# Patient Record
Sex: MALE | Race: BLACK OR AFRICAN AMERICAN | Employment: OTHER | URBAN - METROPOLITAN AREA
[De-identification: names, ages, dates, MRNs, and addresses within clinical notes are randomized per-mention and may not be internally consistent; named-entity substitution may affect disease eponyms.]

---

## 2020-08-12 ENCOUNTER — HOSPITAL ENCOUNTER (OUTPATIENT)
Dept: CT IMAGING | Age: 75
Discharge: HOME | End: 2020-08-14
Payer: COMMERCIAL

## 2020-08-12 PROCEDURE — 70486 CT MAXILLOFACIAL W/O DYE: CPT

## 2020-08-12 PROCEDURE — 76380 CAT SCAN FOLLOW-UP STUDY: CPT

## 2020-10-14 ENCOUNTER — HOSPITAL ENCOUNTER (OUTPATIENT)
Age: 75
Discharge: HOME | End: 2020-10-14
Payer: COMMERCIAL

## 2020-10-14 LAB — IGE: 3 IU/ML

## 2020-10-14 PROCEDURE — 86003 ALLG SPEC IGE CRUDE XTRC EA: CPT

## 2020-10-14 PROCEDURE — 36415 COLL VENOUS BLD VENIPUNCTURE: CPT

## 2020-10-14 PROCEDURE — 82785 ASSAY OF IGE: CPT

## 2020-10-15 LAB
2000687N OAK TREE IGE: <0.34 KU/L (ref 0–0.34)
ALLERGEN BERMUDA GRASS IGE: <0.34 KU/L (ref 0–0.34)
ALLERGEN BIRCH IGE: <0.34 KU/L (ref 0–0.34)
ALLERGEN DOG DANDER IGE: <0.34 KU/L (ref 0–0.34)
ALLERGEN GERMAN COCKROACH IGE: <0.34 KU/L (ref 0–0.34)
ALLERGEN HORMODENDRUM IGE: <0.34 KUL/L (ref 0–0.34)
ALLERGEN JOHNSON GRASS IGE: <0.34 KU/L (ref 0–0.34)
ALLERGEN LAMB'S QUARTER IGE: <0.34 KU/L (ref 0–0.34)
ALLERGEN PIGWEED ROUGH IGE: <0.34 KU/L (ref 0–0.34)
ALLERGEN SHEEP SORREL (W18) IGE: <0.34 KU/L (ref 0–0.34)
ALLERGEN WALNUT TREE IGE: <0.34 KU/L (ref 0–0.34)
ALTERNARIA ALTERNATA: <0.34 KU/L (ref 0–0.34)
ASPERGILLUS FUMIGATUS: <0.34 KU/L (ref 0–0.34)
CANDIDA ALBICANS IGE: <0.34 KU/L (ref 0–0.34)
CAT DANDER ANTIBODY: <0.34 KU/L (ref 0–0.34)
COTTONWOOD TREE: <0.34 KU/L (ref 0–0.34)
D. FARINAE: <0.34 KU/L (ref 0–0.34)
D. PTERONYSSINUS: <0.34 KU/L (ref 0–0.34)
ELM TREE: <0.34 KU/L (ref 0–0.34)
MAPLE/BOXELDER TREE: <0.34 KU/L (ref 0–0.34)
SHORT RAGWD(A ARTEMIS.) IGE: <0.34 KU/L (ref 0–0.34)
TIMOTHY GRASS: <0.34 KU/L (ref 0–0.34)

## 2021-07-25 ENCOUNTER — APPOINTMENT (OUTPATIENT)
Dept: CT IMAGING | Age: 76
End: 2021-07-25
Payer: COMMERCIAL

## 2021-07-25 ENCOUNTER — HOSPITAL ENCOUNTER (EMERGENCY)
Age: 76
Discharge: HOME | End: 2021-07-25
Attending: EMERGENCY MEDICINE
Payer: COMMERCIAL

## 2021-07-25 VITALS
HEIGHT: 64 IN | HEART RATE: 76 BPM | RESPIRATION RATE: 14 BRPM | TEMPERATURE: 98 F | BODY MASS INDEX: 29.6 KG/M2 | WEIGHT: 173.4 LBS | SYSTOLIC BLOOD PRESSURE: 126 MMHG | OXYGEN SATURATION: 97 % | DIASTOLIC BLOOD PRESSURE: 74 MMHG

## 2021-07-25 DIAGNOSIS — R10.31 RIGHT LOWER QUADRANT ABDOMINAL PAIN: Primary | ICD-10-CM

## 2021-07-25 LAB
-: ABNORMAL
ABSOLUTE EOS #: 0.06 K/UL (ref 0–0.44)
ABSOLUTE IMMATURE GRANULOCYTE: 0.03 K/UL (ref 0–0.3)
ABSOLUTE LYMPH #: 1.24 K/UL (ref 1.1–3.7)
ABSOLUTE MONO #: 0.73 K/UL (ref 0.1–1.2)
ALBUMIN SERPL-MCNC: 4.2 G/DL (ref 3.5–5.2)
ALBUMIN/GLOBULIN RATIO: NORMAL (ref 1–2.5)
ALP BLD-CCNC: 105 U/L (ref 40–129)
ALT SERPL-CCNC: 19 U/L (ref 5–41)
AMORPHOUS: ABNORMAL
ANION GAP SERPL CALCULATED.3IONS-SCNC: 12 MMOL/L (ref 9–17)
AST SERPL-CCNC: 16 U/L
BACTERIA: ABNORMAL
BASOPHILS # BLD: 0 % (ref 0–2)
BASOPHILS ABSOLUTE: 0.03 K/UL (ref 0–0.2)
BILIRUB SERPL-MCNC: 0.62 MG/DL (ref 0.3–1.2)
BILIRUBIN DIRECT: 0.14 MG/DL
BILIRUBIN URINE: NEGATIVE
BILIRUBIN, INDIRECT: 0.48 MG/DL (ref 0–1)
BUN BLDV-MCNC: 12 MG/DL (ref 8–23)
BUN/CREAT BLD: 15 (ref 9–20)
CALCIUM SERPL-MCNC: 9.3 MG/DL (ref 8.6–10.4)
CASTS UA: ABNORMAL /LPF
CHLORIDE BLD-SCNC: 105 MMOL/L (ref 98–107)
CO2: 24 MMOL/L (ref 20–31)
COLOR: YELLOW
COMMENT UA: ABNORMAL
CREAT SERPL-MCNC: 0.82 MG/DL (ref 0.7–1.2)
CRYSTALS, UA: ABNORMAL /HPF
DIFFERENTIAL TYPE: ABNORMAL
EOSINOPHILS RELATIVE PERCENT: 1 % (ref 1–4)
EPITHELIAL CELLS UA: ABNORMAL /HPF (ref 0–5)
GFR AFRICAN AMERICAN: >60 ML/MIN
GFR NON-AFRICAN AMERICAN: >60 ML/MIN
GFR SERPL CREATININE-BSD FRML MDRD: ABNORMAL ML/MIN/{1.73_M2}
GFR SERPL CREATININE-BSD FRML MDRD: ABNORMAL ML/MIN/{1.73_M2}
GLOBULIN: NORMAL G/DL (ref 1.5–3.8)
GLUCOSE BLD-MCNC: 130 MG/DL (ref 70–99)
GLUCOSE URINE: NEGATIVE
HCT VFR BLD CALC: 42.7 % (ref 40.7–50.3)
HEMOGLOBIN: 13 G/DL (ref 13–17)
IMMATURE GRANULOCYTES: 0 %
KETONES, URINE: NEGATIVE
LACTIC ACID, SEPSIS WHOLE BLOOD: NORMAL MMOL/L (ref 0.5–1.9)
LACTIC ACID, SEPSIS: 1.5 MMOL/L (ref 0.5–1.9)
LEUKOCYTE ESTERASE, URINE: NEGATIVE
LIPASE: 21 U/L (ref 13–60)
LYMPHOCYTES # BLD: 15 % (ref 24–43)
MCH RBC QN AUTO: 23.6 PG (ref 25.2–33.5)
MCHC RBC AUTO-ENTMCNC: 30.4 G/DL (ref 28.4–34.8)
MCV RBC AUTO: 77.5 FL (ref 82.6–102.9)
MONOCYTES # BLD: 9 % (ref 3–12)
MUCUS: ABNORMAL
NITRITE, URINE: NEGATIVE
NRBC AUTOMATED: 0 PER 100 WBC
OTHER OBSERVATIONS UA: ABNORMAL
PDW BLD-RTO: 17.2 % (ref 11.8–14.4)
PH UA: 5.5 (ref 5–8)
PLATELET # BLD: 217 K/UL (ref 138–453)
PLATELET ESTIMATE: ABNORMAL
PMV BLD AUTO: 9.8 FL (ref 8.1–13.5)
POTASSIUM SERPL-SCNC: 4.4 MMOL/L (ref 3.7–5.3)
PROTEIN UA: ABNORMAL
RBC # BLD: 5.51 M/UL (ref 4.21–5.77)
RBC # BLD: ABNORMAL 10*6/UL
RBC UA: ABNORMAL /HPF (ref 0–2)
RENAL EPITHELIAL, UA: ABNORMAL /HPF
SEG NEUTROPHILS: 75 % (ref 36–65)
SEGMENTED NEUTROPHILS ABSOLUTE COUNT: 5.95 K/UL (ref 1.5–8.1)
SODIUM BLD-SCNC: 141 MMOL/L (ref 135–144)
SPECIFIC GRAVITY UA: 1.03 (ref 1–1.03)
TOTAL PROTEIN: 7.2 G/DL (ref 6.4–8.3)
TRICHOMONAS: ABNORMAL
TURBIDITY: CLEAR
URINE HGB: ABNORMAL
UROBILINOGEN, URINE: NORMAL
WBC # BLD: 8 K/UL (ref 3.5–11.3)
WBC # BLD: ABNORMAL 10*3/UL
WBC UA: ABNORMAL /HPF (ref 0–5)
YEAST: ABNORMAL

## 2021-07-25 PROCEDURE — 83690 ASSAY OF LIPASE: CPT

## 2021-07-25 PROCEDURE — 6360000004 HC RX CONTRAST MEDICATION: Performed by: NURSE PRACTITIONER

## 2021-07-25 PROCEDURE — 80048 BASIC METABOLIC PNL TOTAL CA: CPT

## 2021-07-25 PROCEDURE — 85025 COMPLETE CBC W/AUTO DIFF WBC: CPT

## 2021-07-25 PROCEDURE — 99282 EMERGENCY DEPT VISIT SF MDM: CPT

## 2021-07-25 PROCEDURE — 74177 CT ABD & PELVIS W/CONTRAST: CPT

## 2021-07-25 PROCEDURE — 96374 THER/PROPH/DIAG INJ IV PUSH: CPT

## 2021-07-25 PROCEDURE — 80076 HEPATIC FUNCTION PANEL: CPT

## 2021-07-25 PROCEDURE — 83605 ASSAY OF LACTIC ACID: CPT

## 2021-07-25 PROCEDURE — 2580000003 HC RX 258: Performed by: NURSE PRACTITIONER

## 2021-07-25 PROCEDURE — 6360000002 HC RX W HCPCS: Performed by: NURSE PRACTITIONER

## 2021-07-25 PROCEDURE — 81001 URINALYSIS AUTO W/SCOPE: CPT

## 2021-07-25 RX ORDER — SODIUM CHLORIDE 0.9 % (FLUSH) 0.9 %
10 SYRINGE (ML) INJECTION ONCE
Status: COMPLETED | OUTPATIENT
Start: 2021-07-25 | End: 2021-07-25

## 2021-07-25 RX ORDER — 0.9 % SODIUM CHLORIDE 0.9 %
80 INTRAVENOUS SOLUTION INTRAVENOUS ONCE
Status: COMPLETED | OUTPATIENT
Start: 2021-07-25 | End: 2021-07-25

## 2021-07-25 RX ORDER — SODIUM CHLORIDE 9 MG/ML
INJECTION, SOLUTION INTRAVENOUS CONTINUOUS
Status: DISCONTINUED | OUTPATIENT
Start: 2021-07-25 | End: 2021-07-25 | Stop reason: HOSPADM

## 2021-07-25 RX ORDER — ACETAMINOPHEN AND CODEINE PHOSPHATE 300; 30 MG/1; MG/1
1 TABLET ORAL EVERY 8 HOURS PRN
Qty: 12 TABLET | Refills: 0 | Status: SHIPPED | OUTPATIENT
Start: 2021-07-25 | End: 2021-07-29

## 2021-07-25 RX ORDER — ONDANSETRON 2 MG/ML
4 INJECTION INTRAMUSCULAR; INTRAVENOUS ONCE
Status: COMPLETED | OUTPATIENT
Start: 2021-07-25 | End: 2021-07-25

## 2021-07-25 RX ADMIN — IOPAMIDOL 75 ML: 755 INJECTION, SOLUTION INTRAVENOUS at 12:41

## 2021-07-25 RX ADMIN — SODIUM CHLORIDE, PRESERVATIVE FREE 10 ML: 5 INJECTION INTRAVENOUS at 12:49

## 2021-07-25 RX ADMIN — ONDANSETRON 4 MG: 2 INJECTION INTRAMUSCULAR; INTRAVENOUS at 11:42

## 2021-07-25 RX ADMIN — SODIUM CHLORIDE 80 ML: 9 INJECTION, SOLUTION INTRAVENOUS at 12:50

## 2021-07-25 RX ADMIN — SODIUM CHLORIDE: 9 INJECTION, SOLUTION INTRAVENOUS at 11:42

## 2021-07-25 ASSESSMENT — ENCOUNTER SYMPTOMS
NAUSEA: 1
COLOR CHANGE: 0
DIARRHEA: 0
BACK PAIN: 0
CONSTIPATION: 0
COUGH: 0
ABDOMINAL PAIN: 1
SHORTNESS OF BREATH: 0
VOMITING: 0

## 2021-07-25 ASSESSMENT — PAIN SCALES - GENERAL: PAINLEVEL_OUTOF10: 7

## 2021-07-25 NOTE — ED PROVIDER NOTES
TABLET    Take 40 mg by mouth nightly    TAMSULOSIN (FLOMAX) 0.4 MG CAPSULE    Take 0.4 mg by mouth 2 times daily        PAST MEDICAL HISTORY         Diagnosis Date    Anesthesia complication     pt states he had trouble urinating and moving his bowels (bloody stools) after his last surgery 10/2015)    Arthritis     back    BPH (benign prostatic hyperplasia)     CAD (coronary artery disease)     x 1 stent    Chronic shoulder pain     lt (on 10//28/16 pt states \"I'm due to have surgery next month\"    GERD (gastroesophageal reflux disease)     Hyperlipidemia     Kidney stone     passed    Lung collapse yrs ago    chest tube insertion (patient coughed and sneezed at same time)       SURGICAL HISTORY           Procedure Laterality Date    CERVICAL FUSION      CORONARY ANGIOPLASTY WITH STENT PLACEMENT  3/22/2016    x 1    CYSTOSCOPY  12/04/2015    FINGER REPLANTATION Left     FINGER TRIGGER RELEASE Right     LUMBAR DISCECTOMY  2/10/2016    posterior L5-S1    ROTATOR CUFF REPAIR Bilateral     SHOULDER ARTHROSCOPY Left 01/11/2017    subacromial decompression         FAMILY HISTORY     History reviewed. No pertinent family history. No family status information on file. SOCIAL HISTORY      reports that he quit smoking about 20 years ago. He has a 20.00 pack-year smoking history. He has never used smokeless tobacco. He reports that he does not drink alcohol and does not use drugs. REVIEW OF SYSTEMS    (2-9 systems for level 4, 10 or more for level 5)     Review of Systems   Constitutional: Negative for chills, diaphoresis, fatigue and fever. Respiratory: Negative for cough and shortness of breath. Cardiovascular: Negative for chest pain. Gastrointestinal: Positive for abdominal pain and nausea. Negative for constipation, diarrhea and vomiting. Genitourinary: Negative for dysuria, flank pain, frequency, hematuria and urgency. Musculoskeletal: Negative for back pain.    Skin: Negative for color change, rash and wound. Neurological: Negative for dizziness, weakness and headaches. Except as noted above the remainder of the review of systems was reviewed and negative. PHYSICAL EXAM    (up to 7 for level 4, 8 or more for level 5)     ED Triage Vitals [07/25/21 1121]   BP Temp Temp Source Pulse Resp SpO2 Height Weight   126/74 98 °F (36.7 °C) Oral 76 14 97 % 5' 4\" (1.626 m) 173 lb 6.4 oz (78.7 kg)     Physical Exam  Vitals reviewed. Constitutional:       General: He is not in acute distress. Appearance: He is well-developed. He is not diaphoretic. Eyes:      General: No scleral icterus. Conjunctiva/sclera: Conjunctivae normal.   Neck:      Vascular: No JVD. Cardiovascular:      Rate and Rhythm: Normal rate. Pulmonary:      Effort: Pulmonary effort is normal. No respiratory distress. Breath sounds: No stridor. Abdominal:      General: There is no distension. Palpations: Abdomen is soft. Tenderness: There is abdominal tenderness in the right lower quadrant. There is no guarding. Musculoskeletal:      Comments: Moves extremities   Skin:     General: Skin is warm and dry. Findings: No rash. Neurological:      Mental Status: He is alert and oriented to person, place, and time.    Psychiatric:         Behavior: Behavior normal.         DIAGNOSTIC RESULTS     RADIOLOGY:   Non-plain film images such as CT, Ultrasound and MRI are read by the radiologist. Plain radiographic images are visualized and preliminarily interpreted by the emergency physician with the below findings:    Interpretation per the Radiologist below, if available at the time of this note:    CT ABDOMEN PELVIS W IV CONTRAST Additional Contrast? None    Result Date: 7/25/2021  EXAMINATION: CT OF THE ABDOMEN AND PELVIS WITH CONTRAST 7/25/2021 12:39 pm TECHNIQUE: CT of the abdomen and pelvis was performed with the administration of intravenous contrast. Multiplanar reformatted images are provided for review. Dose modulation, iterative reconstruction, and/or weight based adjustment of the mA/kV was utilized to reduce the radiation dose to as low as reasonably achievable. COMPARISON: 10/24/2015 HISTORY: ORDERING SYSTEM PROVIDED HISTORY: RLQ pain TECHNOLOGIST PROVIDED HISTORY: RLQ pain Decision Support Exception - unselect if not a suspected or confirmed emergency medical condition->Emergency Medical Condition (MA) Reason for Exam: Pt states RLQ pain, nausea. Since 7/21/21, nausea this a.m. Acuity: Acute Type of Exam: Initial FINDINGS: Lower Chest: No acute findings. Organs: The liver, gallbladder, pancreas, spleen, adrenals and kidneys reveal no acute findings. Stable hypoattenuating liver lesion in the central aspect of the liver near the middle hepatic vein on axial image 23 measuring 1.2 cm. This may represent a hemangioma. Subcentimeter renal cysts. GI/Bowel: Diverticulosis. Proximal colonic diverticula are noted with mild induration of the adjacent fat on axial image 81. The appendix is separate from this site and appears normal.  No evidence for bowel obstruction. No gross perforation or evidence for abscess formation. Pelvis: The bladder is decompressed with mild apparent diffuse bladder wall thickening, which may represent underlying hypertrophy that is similar compared to the prior exam.  Mild prostatomegaly. Peritoneum/Retroperitoneum: No free air or free fluid. The aorta is normal in caliber. The visceral branches are patent. Calcified atheromatous plaque is present. No lymphadenopathy. Bones/Soft Tissues: Multilevel disc space narrowing and facet arthropathy. No acute abnormality identified. 1.  Findings compatible with mild acute right-sided diverticulosis in the ascending colon. 2.  Normal appendix.        LABS:  Labs Reviewed   URINALYSIS WITH MICROSCOPIC - Abnormal; Notable for the following components:       Result Value    Specific Gravity, UA 1.035 (*)     Urine Hgb 2+ (*) Protein, UA TRACE (*)     Mucus, UA 1+ (*)     All other components within normal limits   BASIC METABOLIC PANEL - Abnormal; Notable for the following components:    Glucose 130 (*)     All other components within normal limits   CBC WITH AUTO DIFFERENTIAL - Abnormal; Notable for the following components:    MCV 77.5 (*)     MCH 23.6 (*)     RDW 17.2 (*)     Seg Neutrophils 75 (*)     Lymphocytes 15 (*)     All other components within normal limits   LACTATE, SEPSIS   LIPASE   HEPATIC FUNCTION PANEL   LACTATE, SEPSIS       All other labs were within normal range or not returned as of this dictation. EMERGENCY DEPARTMENT COURSE and DIFFERENTIAL DIAGNOSIS/MDM:   Vitals:    Vitals:    07/25/21 1121   BP: 126/74   Pulse: 76   Resp: 14   Temp: 98 °F (36.7 °C)   TempSrc: Oral   SpO2: 97%   Weight: 173 lb 6.4 oz (78.7 kg)   Height: 5' 4\" (1.626 m)       MEDICATIONS GIVEN IN THE ED:  Medications   0.9 % sodium chloride infusion ( Intravenous Stopped 7/25/21 1249)   ondansetron (ZOFRAN) injection 4 mg (4 mg Intravenous Given 7/25/21 1142)   0.9 % sodium chloride bolus (0 mLs Intravenous Stopped 7/25/21 1251)   iopamidol (ISOVUE-370) 76 % injection 75 mL (75 mLs Intravenous Given 7/25/21 1241)   sodium chloride flush 0.9 % injection 10 mL (10 mLs Intravenous Given 7/25/21 1249)       CLINICAL DECISION MAKING:  The patient presented alert with a nontoxic appearance and was seen in conjunction with Dr. Mulu Way. Laboratory studies were unremarkable. Imaging showed diverticulosis. OARRS was reviewed. A prescription was written for tylenol #3. The patient was advised to not drink alcohol, drive, or operate heavy machinery while taking the medication. Follow up with pcp, return to ED if condition worsens. FINAL IMPRESSION      1.  Right lower quadrant abdominal pain            Problem List  Patient Active Problem List   Diagnosis Code    Rectal bleeding K62.5    Acute lower GI bleeding K92.2    Diarrhea R19.7    History of colon polyps Z86.010    Chest pain R07.9    GERD (gastroesophageal reflux disease) K21.9    Hypercholesterolemia E78.00         DISPOSITION/PLAN   DISPOSITION Decision To Discharge 07/25/2021 01:20:23 PM      PATIENT REFERRED TO:   Mala Blue MD  [address] [facility] [address]  [phone]    Schedule an appointment as soon as possible for a visit       [facility] ED  [address] [facility]  [phone]    If symptoms worsen, As needed      DISCHARGE MEDICATIONS:     New Prescriptions    No medications on file           (Please note that portions of this note were completed with a voice recognition program.  Efforts were made to edit the dictations but occasionally words are mis-transcribed.)    KEN Arora - KEN Perez CNP  07/25/21 [address] [place] [address], APRN - CNP  07/25/21 1600

## 2021-07-25 NOTE — ED PROVIDER NOTES
The patient was seen and examined by me in conjunction with the mid-level provider. I agree with his/her assessment and treatment plan. CAT scan shows no cause for his pain. The rest of his work-up is negative as well.      Adriano Hurley MD  07/25/21 6079

## 2023-01-27 ENCOUNTER — ANESTHESIA (OUTPATIENT)
Dept: OPERATING ROOM | Age: 78
End: 2023-01-27
Payer: COMMERCIAL

## 2023-01-27 ENCOUNTER — HOSPITAL ENCOUNTER (OUTPATIENT)
Dept: ULTRASOUND IMAGING | Age: 78
End: 2023-01-27
Attending: UROLOGY
Payer: COMMERCIAL

## 2023-01-27 ENCOUNTER — ANESTHESIA EVENT (OUTPATIENT)
Dept: OPERATING ROOM | Age: 78
End: 2023-01-27
Payer: COMMERCIAL

## 2023-01-27 ENCOUNTER — HOSPITAL ENCOUNTER (EMERGENCY)
Age: 78
Discharge: HOME | End: 2023-01-28
Attending: STUDENT IN AN ORGANIZED HEALTH CARE EDUCATION/TRAINING PROGRAM
Payer: COMMERCIAL

## 2023-01-27 ENCOUNTER — HOSPITAL ENCOUNTER (OUTPATIENT)
Age: 78
Setting detail: OUTPATIENT SURGERY
Discharge: HOME | End: 2023-01-27
Attending: UROLOGY | Admitting: UROLOGY
Payer: COMMERCIAL

## 2023-01-27 VITALS
HEART RATE: 79 BPM | OXYGEN SATURATION: 99 % | SYSTOLIC BLOOD PRESSURE: 96 MMHG | DIASTOLIC BLOOD PRESSURE: 59 MMHG | WEIGHT: 170 LBS | RESPIRATION RATE: 18 BRPM | TEMPERATURE: 97.2 F | BODY MASS INDEX: 29.02 KG/M2 | HEIGHT: 64 IN

## 2023-01-27 VITALS
OXYGEN SATURATION: 97 % | HEIGHT: 64 IN | TEMPERATURE: 98.6 F | BODY MASS INDEX: 29.02 KG/M2 | WEIGHT: 170 LBS | HEART RATE: 103 BPM | RESPIRATION RATE: 20 BRPM | SYSTOLIC BLOOD PRESSURE: 128 MMHG | DIASTOLIC BLOOD PRESSURE: 41 MMHG

## 2023-01-27 DIAGNOSIS — N30.01 ACUTE CYSTITIS WITH HEMATURIA: ICD-10-CM

## 2023-01-27 DIAGNOSIS — K62.5 RECTAL BLEEDING: Primary | ICD-10-CM

## 2023-01-27 DIAGNOSIS — R31.0 GROSS HEMATURIA: ICD-10-CM

## 2023-01-27 DIAGNOSIS — R97.20 ELEVATED PSA: ICD-10-CM

## 2023-01-27 DIAGNOSIS — R33.9 URINARY RETENTION: Primary | ICD-10-CM

## 2023-01-27 LAB
ABSOLUTE EOS #: 0.05 K/UL (ref 0–0.44)
ABSOLUTE IMMATURE GRANULOCYTE: 0.08 K/UL (ref 0–0.3)
ABSOLUTE LYMPH #: 1.21 K/UL (ref 1.1–3.7)
ABSOLUTE MONO #: 0.96 K/UL (ref 0.1–1.2)
ANION GAP SERPL CALCULATED.3IONS-SCNC: 11 MMOL/L (ref 9–17)
BASOPHILS # BLD: 0 % (ref 0–2)
BASOPHILS ABSOLUTE: 0.04 K/UL (ref 0–0.2)
BILIRUBIN URINE: NEGATIVE
BILIRUBIN URINE: NEGATIVE
BUN BLDV-MCNC: 15 MG/DL (ref 8–23)
BUN/CREAT BLD: 11 (ref 9–20)
CALCIUM SERPL-MCNC: 9.4 MG/DL (ref 8.6–10.4)
CHLORIDE BLD-SCNC: 102 MMOL/L (ref 98–107)
CO2: 24 MMOL/L (ref 20–31)
COLOR: ABNORMAL
COLOR: YELLOW
CREAT SERPL-MCNC: 1.33 MG/DL (ref 0.7–1.2)
EOSINOPHILS RELATIVE PERCENT: 0 % (ref 1–4)
EPITHELIAL CELLS UA: ABNORMAL /HPF (ref 0–5)
EPITHELIAL CELLS UA: NORMAL /HPF (ref 0–5)
GFR SERPL CREATININE-BSD FRML MDRD: 56 ML/MIN/1.73M2
GLUCOSE BLD-MCNC: 129 MG/DL (ref 70–99)
GLUCOSE URINE: NEGATIVE
GLUCOSE URINE: NEGATIVE
HCT VFR BLD CALC: 42.5 % (ref 40.7–50.3)
HEMOGLOBIN: 13 G/DL (ref 13–17)
IMMATURE GRANULOCYTES: 1 %
INR BLD: 1.1
KETONES, URINE: NEGATIVE
KETONES, URINE: NEGATIVE
LEUKOCYTE ESTERASE, URINE: ABNORMAL
LEUKOCYTE ESTERASE, URINE: NEGATIVE
LYMPHOCYTES # BLD: 9 % (ref 24–43)
MCH RBC QN AUTO: 23.3 PG (ref 25.2–33.5)
MCHC RBC AUTO-ENTMCNC: 30.6 G/DL (ref 28.4–34.8)
MCV RBC AUTO: 76.2 FL (ref 82.6–102.9)
MONOCYTES # BLD: 7 % (ref 3–12)
NITRITE, URINE: NEGATIVE
NITRITE, URINE: POSITIVE
NRBC AUTOMATED: 0 PER 100 WBC
PARTIAL THROMBOPLASTIN TIME: 31.3 SEC (ref 23.9–33.8)
PDW BLD-RTO: 17.5 % (ref 11.8–14.4)
PH UA: 6 (ref 5–8)
PH UA: 6 (ref 5–8)
PLATELET # BLD: 221 K/UL (ref 138–453)
PMV BLD AUTO: 9.8 FL (ref 8.1–13.5)
POTASSIUM SERPL-SCNC: 4.5 MMOL/L (ref 3.7–5.3)
PROTEIN UA: ABNORMAL
PROTEIN UA: NEGATIVE
PROTHROMBIN TIME: 13.8 SEC (ref 11.5–14.2)
RBC # BLD: 5.58 M/UL (ref 4.21–5.77)
RBC # BLD: ABNORMAL 10*6/UL
RBC UA: ABNORMAL /HPF (ref 0–2)
RBC UA: NORMAL /HPF (ref 0–2)
SEG NEUTROPHILS: 83 % (ref 36–65)
SEGMENTED NEUTROPHILS ABSOLUTE COUNT: 11.27 K/UL (ref 1.5–8.1)
SODIUM BLD-SCNC: 137 MMOL/L (ref 135–144)
SPECIFIC GRAVITY UA: 1.01 (ref 1–1.03)
SPECIFIC GRAVITY UA: 1.02 (ref 1–1.03)
TURBIDITY: ABNORMAL
TURBIDITY: ABNORMAL
URINE HGB: ABNORMAL
URINE HGB: ABNORMAL
UROBILINOGEN, URINE: NORMAL
UROBILINOGEN, URINE: NORMAL
WBC # BLD: 13.6 K/UL (ref 3.5–11.3)
WBC UA: ABNORMAL /HPF (ref 0–5)
WBC UA: NORMAL /HPF (ref 0–5)

## 2023-01-27 PROCEDURE — 3700000001 HC ADD 15 MINUTES (ANESTHESIA): Performed by: UROLOGY

## 2023-01-27 PROCEDURE — 87086 URINE CULTURE/COLONY COUNT: CPT

## 2023-01-27 PROCEDURE — 2580000003 HC RX 258: Performed by: ANESTHESIOLOGY

## 2023-01-27 PROCEDURE — 2500000003 HC RX 250 WO HCPCS: Performed by: NURSE ANESTHETIST, CERTIFIED REGISTERED

## 2023-01-27 PROCEDURE — 3700000000 HC ANESTHESIA ATTENDED CARE: Performed by: UROLOGY

## 2023-01-27 PROCEDURE — 85610 PROTHROMBIN TIME: CPT

## 2023-01-27 PROCEDURE — 76942 ECHO GUIDE FOR BIOPSY: CPT

## 2023-01-27 PROCEDURE — 7100000010 HC PHASE II RECOVERY - FIRST 15 MIN: Performed by: UROLOGY

## 2023-01-27 PROCEDURE — 6360000002 HC RX W HCPCS: Performed by: NURSE ANESTHETIST, CERTIFIED REGISTERED

## 2023-01-27 PROCEDURE — 81001 URINALYSIS AUTO W/SCOPE: CPT

## 2023-01-27 PROCEDURE — 80048 BASIC METABOLIC PNL TOTAL CA: CPT

## 2023-01-27 PROCEDURE — 88305 TISSUE EXAM BY PATHOLOGIST: CPT

## 2023-01-27 PROCEDURE — 6370000000 HC RX 637 (ALT 250 FOR IP): Performed by: UROLOGY

## 2023-01-27 PROCEDURE — 3600000012 HC SURGERY LEVEL 2 ADDTL 15MIN: Performed by: UROLOGY

## 2023-01-27 PROCEDURE — 6360000002 HC RX W HCPCS: Performed by: UROLOGY

## 2023-01-27 PROCEDURE — 85730 THROMBOPLASTIN TIME PARTIAL: CPT

## 2023-01-27 PROCEDURE — 7100000011 HC PHASE II RECOVERY - ADDTL 15 MIN: Performed by: UROLOGY

## 2023-01-27 PROCEDURE — 2580000003 HC RX 258: Performed by: UROLOGY

## 2023-01-27 PROCEDURE — 2709999900 HC NON-CHARGEABLE SUPPLY: Performed by: UROLOGY

## 2023-01-27 PROCEDURE — 88344 IMHCHEM/IMCYTCHM EA MLT ANTB: CPT

## 2023-01-27 PROCEDURE — 6370000000 HC RX 637 (ALT 250 FOR IP): Performed by: STUDENT IN AN ORGANIZED HEALTH CARE EDUCATION/TRAINING PROGRAM

## 2023-01-27 PROCEDURE — 85025 COMPLETE CBC W/AUTO DIFF WBC: CPT

## 2023-01-27 PROCEDURE — 3600000002 HC SURGERY LEVEL 2 BASE: Performed by: UROLOGY

## 2023-01-27 PROCEDURE — 55700 US BIOPSY PROSTATE NEEDLE/PUNCH: CPT | Performed by: UROLOGY

## 2023-01-27 RX ORDER — LIDOCAINE HYDROCHLORIDE 20 MG/ML
INJECTION, SOLUTION EPIDURAL; INFILTRATION; INTRACAUDAL; PERINEURAL PRN
Status: DISCONTINUED | OUTPATIENT
Start: 2023-01-27 | End: 2023-01-27 | Stop reason: SDUPTHER

## 2023-01-27 RX ORDER — CIPROFLOXACIN 500 MG/1
1 TABLET, FILM COATED ORAL 2 TIMES DAILY
COMMUNITY
Start: 2022-12-08 | End: 2023-01-28

## 2023-01-27 RX ORDER — SODIUM CHLORIDE 0.9 % (FLUSH) 0.9 %
5-40 SYRINGE (ML) INJECTION PRN
Status: DISCONTINUED | OUTPATIENT
Start: 2023-01-27 | End: 2023-01-27 | Stop reason: HOSPADM

## 2023-01-27 RX ORDER — HYDROMORPHONE HYDROCHLORIDE 1 MG/ML
0.5 INJECTION, SOLUTION INTRAMUSCULAR; INTRAVENOUS; SUBCUTANEOUS EVERY 5 MIN PRN
Status: DISCONTINUED | OUTPATIENT
Start: 2023-01-27 | End: 2023-01-27 | Stop reason: HOSPADM

## 2023-01-27 RX ORDER — ASPIRIN 81 MG/1
81 TABLET, CHEWABLE ORAL DAILY
COMMUNITY

## 2023-01-27 RX ORDER — HYDROCODONE BITARTRATE AND ACETAMINOPHEN 5; 325 MG/1; MG/1
1 TABLET ORAL ONCE
Status: COMPLETED | OUTPATIENT
Start: 2023-01-27 | End: 2023-01-27

## 2023-01-27 RX ORDER — LABETALOL HYDROCHLORIDE 5 MG/ML
10 INJECTION, SOLUTION INTRAVENOUS
Status: DISCONTINUED | OUTPATIENT
Start: 2023-01-27 | End: 2023-01-27 | Stop reason: HOSPADM

## 2023-01-27 RX ORDER — OXYCODONE HYDROCHLORIDE 5 MG/1
10 TABLET ORAL PRN
Status: DISCONTINUED | OUTPATIENT
Start: 2023-01-27 | End: 2023-01-27 | Stop reason: HOSPADM

## 2023-01-27 RX ORDER — PROPOFOL 10 MG/ML
INJECTION, EMULSION INTRAVENOUS CONTINUOUS PRN
Status: DISCONTINUED | OUTPATIENT
Start: 2023-01-27 | End: 2023-01-27 | Stop reason: SDUPTHER

## 2023-01-27 RX ORDER — SODIUM CHLORIDE, SODIUM LACTATE, POTASSIUM CHLORIDE, CALCIUM CHLORIDE 600; 310; 30; 20 MG/100ML; MG/100ML; MG/100ML; MG/100ML
INJECTION, SOLUTION INTRAVENOUS CONTINUOUS
Status: DISCONTINUED | OUTPATIENT
Start: 2023-01-27 | End: 2023-01-27 | Stop reason: HOSPADM

## 2023-01-27 RX ORDER — LIDOCAINE HYDROCHLORIDE 10 MG/ML
1 INJECTION, SOLUTION EPIDURAL; INFILTRATION; INTRACAUDAL; PERINEURAL
Status: DISCONTINUED | OUTPATIENT
Start: 2023-01-28 | End: 2023-01-27 | Stop reason: HOSPADM

## 2023-01-27 RX ORDER — OXYCODONE HYDROCHLORIDE 5 MG/1
5 TABLET ORAL PRN
Status: DISCONTINUED | OUTPATIENT
Start: 2023-01-27 | End: 2023-01-27 | Stop reason: HOSPADM

## 2023-01-27 RX ORDER — LIDOCAINE HYDROCHLORIDE 20 MG/ML
5 JELLY TOPICAL ONCE
Status: COMPLETED | OUTPATIENT
Start: 2023-01-27 | End: 2023-01-27

## 2023-01-27 RX ORDER — SODIUM CHLORIDE 9 MG/ML
INJECTION, SOLUTION INTRAVENOUS CONTINUOUS
Status: DISCONTINUED | OUTPATIENT
Start: 2023-01-27 | End: 2023-01-27

## 2023-01-27 RX ORDER — SODIUM CHLORIDE 9 MG/ML
INJECTION, SOLUTION INTRAVENOUS PRN
Status: DISCONTINUED | OUTPATIENT
Start: 2023-01-27 | End: 2023-01-27 | Stop reason: HOSPADM

## 2023-01-27 RX ORDER — ONDANSETRON 2 MG/ML
INJECTION INTRAMUSCULAR; INTRAVENOUS PRN
Status: DISCONTINUED | OUTPATIENT
Start: 2023-01-27 | End: 2023-01-27 | Stop reason: SDUPTHER

## 2023-01-27 RX ORDER — EPHEDRINE SULFATE/0.9% NACL/PF 50 MG/5 ML
SYRINGE (ML) INTRAVENOUS PRN
Status: DISCONTINUED | OUTPATIENT
Start: 2023-01-27 | End: 2023-01-27 | Stop reason: SDUPTHER

## 2023-01-27 RX ORDER — LIDOCAINE HYDROCHLORIDE 20 MG/ML
JELLY TOPICAL PRN
Status: DISCONTINUED | OUTPATIENT
Start: 2023-01-27 | End: 2023-01-27 | Stop reason: ALTCHOICE

## 2023-01-27 RX ORDER — FENTANYL CITRATE 50 UG/ML
25 INJECTION, SOLUTION INTRAMUSCULAR; INTRAVENOUS EVERY 5 MIN PRN
Status: DISCONTINUED | OUTPATIENT
Start: 2023-01-27 | End: 2023-01-27 | Stop reason: HOSPADM

## 2023-01-27 RX ORDER — PROCHLORPERAZINE EDISYLATE 5 MG/ML
5 INJECTION INTRAMUSCULAR; INTRAVENOUS
Status: DISCONTINUED | OUTPATIENT
Start: 2023-01-27 | End: 2023-01-27 | Stop reason: HOSPADM

## 2023-01-27 RX ORDER — PHENYLEPHRINE HCL IN 0.9% NACL 1 MG/10 ML
SYRINGE (ML) INTRAVENOUS PRN
Status: DISCONTINUED | OUTPATIENT
Start: 2023-01-27 | End: 2023-01-27 | Stop reason: SDUPTHER

## 2023-01-27 RX ORDER — FENTANYL CITRATE 50 UG/ML
INJECTION, SOLUTION INTRAMUSCULAR; INTRAVENOUS PRN
Status: DISCONTINUED | OUTPATIENT
Start: 2023-01-27 | End: 2023-01-27 | Stop reason: SDUPTHER

## 2023-01-27 RX ORDER — SODIUM CHLORIDE 0.9 % (FLUSH) 0.9 %
5-40 SYRINGE (ML) INJECTION EVERY 12 HOURS SCHEDULED
Status: DISCONTINUED | OUTPATIENT
Start: 2023-01-27 | End: 2023-01-27 | Stop reason: HOSPADM

## 2023-01-27 RX ORDER — SULFAMETHOXAZOLE AND TRIMETHOPRIM 400; 80 MG/1; MG/1
1 TABLET ORAL 2 TIMES DAILY
Qty: 10 TABLET | Refills: 0 | Status: SHIPPED | OUTPATIENT
Start: 2023-01-27 | End: 2023-02-01

## 2023-01-27 RX ORDER — ONDANSETRON 2 MG/ML
4 INJECTION INTRAMUSCULAR; INTRAVENOUS
Status: DISCONTINUED | OUTPATIENT
Start: 2023-01-27 | End: 2023-01-27 | Stop reason: HOSPADM

## 2023-01-27 RX ADMIN — FENTANYL CITRATE 25 MCG: 50 INJECTION INTRAMUSCULAR; INTRAVENOUS at 13:53

## 2023-01-27 RX ADMIN — Medication 200 MCG: at 14:10

## 2023-01-27 RX ADMIN — SODIUM CHLORIDE, POTASSIUM CHLORIDE, SODIUM LACTATE AND CALCIUM CHLORIDE: 600; 310; 30; 20 INJECTION, SOLUTION INTRAVENOUS at 11:35

## 2023-01-27 RX ADMIN — Medication 200 MCG: at 14:15

## 2023-01-27 RX ADMIN — HYDROCODONE BITARTRATE AND ACETAMINOPHEN 1 TABLET: 5; 325 TABLET ORAL at 15:04

## 2023-01-27 RX ADMIN — LIDOCAINE HYDROCHLORIDE 50 MG: 20 INJECTION, SOLUTION EPIDURAL; INFILTRATION; INTRACAUDAL; PERINEURAL at 13:56

## 2023-01-27 RX ADMIN — ONDANSETRON 4 MG: 2 INJECTION INTRAMUSCULAR; INTRAVENOUS at 14:00

## 2023-01-27 RX ADMIN — Medication 200 MCG: at 14:18

## 2023-01-27 RX ADMIN — Medication 20 MG: at 14:20

## 2023-01-27 RX ADMIN — Medication 100 MCG: at 14:03

## 2023-01-27 RX ADMIN — TOBRAMYCIN 120 MG: 40 INJECTION INTRAMUSCULAR; INTRAVENOUS at 12:41

## 2023-01-27 RX ADMIN — PROPOFOL 75 MCG/KG/MIN: 10 INJECTION, EMULSION INTRAVENOUS at 13:56

## 2023-01-27 RX ADMIN — Medication 200 MCG: at 14:20

## 2023-01-27 RX ADMIN — PROPOFOL 20 MG: 10 INJECTION, EMULSION INTRAVENOUS at 13:54

## 2023-01-27 RX ADMIN — FENTANYL CITRATE 25 MCG: 50 INJECTION INTRAMUSCULAR; INTRAVENOUS at 14:08

## 2023-01-27 RX ADMIN — LIDOCAINE HYDROCHLORIDE 5 ML: 20 JELLY TOPICAL at 21:05

## 2023-01-27 ASSESSMENT — PAIN - FUNCTIONAL ASSESSMENT: PAIN_FUNCTIONAL_ASSESSMENT: 0-10

## 2023-01-27 ASSESSMENT — PAIN SCALES - GENERAL: PAINLEVEL_OUTOF10: 10

## 2023-01-27 NOTE — H&P
History and Physical Service   [facility] [place] - INPATIENT    HISTORY AND PHYSICAL EXAMINATION            Date of Evaluation: 1/27/2023  Patient name:  Reta Martinez  MRN:   [id number]  YOB: 1945  PCP: Margaret Bahena MD    History Obtained From:     Patient, medical records    History of Present Illness: This is Reta Martinez a 78 y.o. male who presents today for a CYSTOSCOPY PROSTATE BIOPSY   WITH U/S TECH by Ben Clement MD for Elevated PSA [R97.20]. Patient last saw Dr. Jada Mcleod in office on 12/08/2022. Due to his elevated PSA it was recommended to have a prostate biopsy. He has been experiencing urinary urgency and frequency as well as difficulty starting his stream and emptying his bladder fully. He denies any dysuria or hematuria. Denies fever, chills, shortness of breath, cough, congestion, wheezing, chest pain, open sores or wounds. Denies hx of diabetes. Last aspirin 01/19/2023. Hx of CAD with one cardiac stent in 2016. Followed with cardiology Dr. Seth Pereira but he has left the area and patient states he \"hasn't seen anyone in about 2 years\". Denies any current chest pain, shortness of breath, palpitations, or lightheadedness. Patient states after anesthesia in the past he had urinary retention and constipation and had to be discharge home with a carroll.      Past Medical History:     Past Medical History:   Diagnosis Date    Anesthesia complication     pt states he had trouble urinating and moving his bowels (bloody stools) after his last surgery 10/2015)    Arthritis     back    BPH (benign prostatic hyperplasia)     CAD (coronary artery disease)     x 1 stent    Chronic shoulder pain     lt (on 10//28/16 pt states \"I'm due to have surgery next month\"    GERD (gastroesophageal reflux disease)     Hyperlipidemia     Kidney stone     passed    Lung collapse yrs ago    chest tube insertion (patient coughed and sneezed at same time)        Past Surgical History:     Past Surgical History:   Procedure Laterality Date    CERVICAL FUSION      CORONARY ANGIOPLASTY WITH STENT PLACEMENT  3/22/2016    x 1    CYSTOSCOPY  12/04/2015    FINGER REPLANTATION Left     LUMBAR DISCECTOMY  2/10/2016    posterior L5-S1    ROTATOR CUFF REPAIR Bilateral     SHOULDER ARTHROSCOPY Left 01/11/2017    subacromial decompression    TRIGGER FINGER RELEASE Right         Medications Prior to Admission:     Prior to Admission medications    Medication Sig Start Date End Date Taking?  Authorizing Provider   carvedilol (COREG) 12.5 MG tablet Take 12.5 mg by mouth 2 times daily (with meals) Unsure of dose    Historical Provider, MD   prasugrel (EFFIENT) 10 MG TABS Take 10 mg by mouth 2 times daily On 10/28/16 pt states \"I think the Dr took me off of that\"    Historical Provider, MD   isosorbide mononitrate (IMDUR) 30 MG CR tablet Take 30 mg by mouth daily    Historical Provider, MD   lisinopril (PRINIVIL;ZESTRIL) 2.5 MG tablet Take 2.5 mg by mouth daily    Historical Provider, MD   nitroGLYCERIN (NITROSTAT) 0.4 MG SL tablet Place 0.4 mg under the tongue every 5 minutes as needed for Chest pain    Historical Provider, MD   oxyCODONE-acetaminophen (PERCOCET) 5-325 MG per tablet Take 1 tablet by mouth every 4 hours as needed for Pain out    Historical Provider, MD   ibuprofen (ADVIL;MOTRIN) 800 MG tablet Take 800 mg by mouth every 6 hours as needed for Pain    Historical Provider, MD   ferrous sulfate 325 (65 FE) MG EC tablet Take 1 tablet by mouth daily (with breakfast) 2/11/16   Kalie Zhang MD   docusate sodium (COLACE, DULCOLAX) 100 MG CAPS Take 100 mg by mouth 2 times daily 2/11/16   Kalie Zhang MD   aspirin 325 MG EC tablet Take 1 tablet by mouth 2 times daily  Patient taking differently: Take 81 mg by mouth daily  2/11/16 1/11/17  Kalie Zhang MD   tamsulosin (FLOMAX) 0.4 MG capsule Take 0.4 mg by mouth 2 times daily     Historical Provider, MD   omeprazole (PRILOSEC) 20 MG capsule Take 20 mg by mouth daily Historical Provider, MD   simvastatin (ZOCOR) 40 MG tablet Take 40 mg by mouth nightly    Historical Provider, MD        Allergies:     Pcn [penicillins]    Social History:     Tobacco:    reports that he quit smoking about 22 years ago. He has a 20.00 pack-year smoking history. He has never used smokeless tobacco.  Alcohol:      reports no history of alcohol use. Drug Use:  reports no history of drug use. Family History:     No family history on file. Review of Systems:     Positive and Negative as described in HPI. CONSTITUTIONAL: negative for fevers, chills, sweats, fatigue, weight loss  HEENT: Bilateral hearing aides. negative for vision, runny nose, throat pain  RESPIRATORY: Hx of lung collapse with chest tube \"a long time ago\". Shortness of breath with exertion. negative for cough, congestion, wheezing. CARDIOVASCULAR: CAD with stent placement 2016. Hyperlipidemia. negative for chest pain, palpitations. GASTROINTESTINAL:  negative for nausea, vomiting, diarrhea, constipation, change in bowel habits, abdominal pain   GENITOURINARY: See HPI. INTEGUMENT: Moles throughout all areas. negative for rash, skin lesions, easy bruising   HEMATOLOGIC/LYMPHATIC:  negative for swelling/edema   ALLERGIC/IMMUNOLOGIC:  negative for urticaria , itching  ENDOCRINE:  negative increase in drinking, increase in urination, hot or cold intolerance  MUSCULOSKELETAL: Arthritis, chronic shoulder pain. L rib pain over breast area. negative swelling of joints  NEUROLOGICAL:  negative for headaches, dizziness, lightheadedness, numbness, pain, tingling extremities  BEHAVIOR/PSYCH:  negative for depression, anxiety    Physical Exam:   /65   Pulse 96   Temp 97.1 °F (36.2 °C) (Temporal)   Resp 16   SpO2 98%   No LMP for male patient. No obstetric history on file. No results for input(s): POCGLU in the last 72 hours.     General Appearance:  alert, well appearing, and in no acute distress  Mental status: oriented to person, place, and time with normal affect  Head:  normocephalic, atraumatic. Eye: no icterus, redness, pupils equal and reactive, extraocular eye movements intact, conjunctiva clear  Ear: Bilateral hearing aides. normal external ear, no discharge  Nose:  no drainage noted  Mouth: mucous membranes moist  Neck: supple, no carotid bruits, thyroid not palpable  Lungs: Bilateral equal air entry, clear to ausculation, no wheezing, rales or rhonchi, normal effort  Cardiovascular: HR 96 normal rate, regular rhythm, no murmur, gallop, rub. Abdomen: Obese. Soft, nontender, nondistended, normal bowel sounds  Neurologic: There are no new focal motor or sensory deficits, normal muscle tone and bulk, normal speech, cranial nerves II through XII grossly intact  Skin: Scattered moles on body- no open or draining areas. No gross lesions, rashes, bruising or bleeding on exposed skin area  Extremities:  Chronic numbness and tingling following past back surgery, more on left side. peripheral pulses palpable, no pedal edema or calf pain with palpation  Psych: normal affect     Investigations:      Laboratory Testing:  No results found for this or any previous visit (from the past 24 hour(s)). No results for input(s): HGB, HCT, WBC, MCV, PLATELET, NA, K, CL, CO2, BUN, CREATININE, GLUCOSE, INR, PROTIME, APTT, AST, ALT, LABALBU, HCG in the last 720 hours. No results for input(s): COVID19 in the last 720 hours. Imaging/Diagnostics:    No results found.       Diagnosis:      Elevated PSA [R97.20]    Plans:     1. CYSTOSCOPY PROSTATE BIOPSY   WITH /S [facility]      KEN Chaves - CNP  1/27/2023  11:11 AM

## 2023-01-27 NOTE — ANESTHESIA POSTPROCEDURE EVALUATION
Department of Anesthesiology  Postprocedure Note    Patient: Cam Martinez  MRN: [id number]  YOB: 1945  Date of evaluation: 1/27/2023      Procedure Summary     Date: 01/27/23 Room / Location: [facility] - INPATIENT    Anesthesia Start: 1351 Anesthesia Stop: 9541    Procedure: CYSTOSCOPY PROSTATE BIOPSY   WITH U/S [facility] Diagnosis:       Elevated PSA      (Elevated PSA [R97.20])    Surgeons: Guillermo Bryan MD Responsible Provider: Saran Durbin MD    Anesthesia Type: general, MAC ASA Status: 3          Anesthesia Type: No value filed.     Tracie Phase I:      Tracie Phase II: Tracie Score: 10      Anesthesia Post Evaluation    Patient location during evaluation: PACU  Patient participation: complete - patient participated  Level of consciousness: awake  Airway patency: patent  Nausea & Vomiting: no nausea and no vomiting  Complications: no  Cardiovascular status: hemodynamically stable  Respiratory status: acceptable  Hydration status: stable  Multimodal analgesia pain management approach

## 2023-01-27 NOTE — OP NOTE
Operative Note      Patient: Jo Renee  YOB: 1945  MRN: [id number]    Date of Procedure: 1/27/2023    Pre-Op Diagnosis: Elevated PSA [R97.20]                                Enlarged prostate with bladder outlet obstruction    Post-Op Diagnosis: Same and trabeculated bladder with thickened bladder wall       Procedure(s):  CYSTOSCOPY PROSTATE BIOPSY   WITH U/S [facility]    Surgeon(s):  Scarlett Amato MD    Assistant:   * No surgical staff found *    Anesthesia: Monitor Anesthesia Care    Estimated Blood Loss (mL): Minimal    Complications: None    Specimens:   ID Type Source Tests Collected by Time Destination   1 : URINE Urine Urine, Cystoscopic URINALYSIS WITH REFLEX TO CULTURE Scarlett Amato MD 1/27/2023 1402    A : RIGHT BASE LATERAL Tissue Prostate SURGICAL PATHOLOGY Scarlett Amato MD 1/27/2023 1355    B : RIGHT BASE MEDIAL Tissue Prostate SURGICAL PATHOLOGY Scarlett Amato MD 1/27/2023 1355    C : RIGHT MID LATERAL Tissue Prostate SURGICAL PATHOLOGY Scarlett Amato MD 1/27/2023 1355    D : RIGHT MID MEDIAL Tissue Prostate SURGICAL PATHOLOGY Scarlett Amato MD 1/27/2023 1355    E : RIGHT APEX LATERAL Tissue Prostate SURGICAL PATHOLOGY Scarlett Amato MD 1/27/2023 1355    F : RIGHT APEX MEDIAL Tissue Prostate SURGICAL PATHOLOGY Scarlett Amato MD 1/27/2023 1355    G : LEFT BASE LATERAL Tissue Prostate SURGICAL PATHOLOGY Scarlett Amato MD 1/27/2023 1355    H : LEFT BASE MEDIAL Tissue Prostate SURGICAL PATHOLOGY Scarlett Amato MD 1/27/2023 1355    I : LEFT MID LATERAL Tissue Prostate SURGICAL PATHOLOGY Scarlett Amato MD 1/27/2023 1355    J : LEFT MID MEDIAL Tissue Prostate SURGICAL PATHOLOGY Scarlett Amato MD 1/27/2023 1355    K : LEFT APEX LATERAL Tissue Prostate SURGICAL PATHOLOGY Scarlett Amato MD 1/27/2023 1355    L : LEFT APEX MEDIAL Tissue Prostate SURGICAL PATHOLOGY Scarlett Amato MD 1/27/2023 1355        Implants:  * No implants in log *      Drains: * No LDAs found *    Findings: Indications: This patient is 78years old, he has a markedly enlarged prostate with a volume of greater than 100 ML noted by MRI studies, the patient has a nodular prostate, rising Exosome DX and PSA consequently a biopsy of the prostate as well as cystoscopy were recommended    Detailed Description of Procedure:   Patient was brought to the operating room, positioned in supine, proper patient identification procedure identification prepping and draping. We entered the bladder with the cystoscope, using the flexible cystoscopy, we identified the enlargement of prostate gland with a prostate urethra measuring about 2.5 cm, the interior the bladder was examined, thickened bladder wall identified, with early bladder trabeculations, the ureteral orifices effluxing clear urine. The remainder of the bladder examination unremarkable. We then removed the cystoscope, and the patient was positioned in left lateral position. Transrectal ultrasonography was initiated    Scanning of the prostate demonstrated a prostate volume of  84 ML by ultrasonography, the prostate was noted to be heterogeneous in appearance with hyperechoic and hypoechoic areas with calcifications. Ultrasound-guided biopsies were then carried out, biopsies were obtained from the base of the prostate the mid gland and the appendix on the right and the left and sent to pathology separately, a total of 24 specimens were obtained to be assessed by pathology. At the completion the transrectal probe was removed and the patient was returned to recovery room in stable condition    Recommendations discussed findings with the patient and his wife he will return to the office to review the pathology report and the plan of care.     Patient will continue with antibiotic therapy as outlined, it should be noted that she was given gentamicin 120 mg IVPB prior to the biopsy and he continued on the Cipro for 2 days    Electronically signed by Emmett Heard MD on 1/27/2023 at 3:24 PM

## 2023-01-27 NOTE — ANESTHESIA PRE PROCEDURE
Department of Anesthesiology  Preprocedure Note       Name:  Richardine Severs   Age:  78 y.o.  :  1945                                          MRN:  [id number]         Date:  2023      Surgeon: Cory Shah):  Edith Stephen MD    Procedure: Procedure(s):  CYSTOSCOPY PROSTATE BIOPSY   WITH U/S [facility]    Medications prior to admission:   Prior to Admission medications    Medication Sig Start Date End Date Taking?  Authorizing Provider   aspirin 81 MG chewable tablet Take 81 mg by mouth daily   Yes Historical Provider, MD   carvedilol (COREG) 12.5 MG tablet Take 12.5 mg by mouth 2 times daily (with meals) Unsure of dose    Historical Provider, MD   prasugrel (EFFIENT) 10 MG TABS Take 10 mg by mouth 2 times daily On 10/28/16 pt states \"I think the Dr took me off of that\"    Historical Provider, MD   isosorbide mononitrate (IMDUR) 30 MG CR tablet Take 30 mg by mouth daily    Historical Provider, MD   lisinopril (PRINIVIL;ZESTRIL) 2.5 MG tablet Take 2.5 mg by mouth daily    Historical Provider, MD   nitroGLYCERIN (NITROSTAT) 0.4 MG SL tablet Place 0.4 mg under the tongue every 5 minutes as needed for Chest pain    Historical Provider, MD   oxyCODONE-acetaminophen (PERCOCET) 5-325 MG per tablet Take 1 tablet by mouth every 4 hours as needed for Pain out    Historical Provider, MD   ibuprofen (ADVIL;MOTRIN) 800 MG tablet Take 800 mg by mouth every 6 hours as needed for Pain    Historical Provider, MD   ferrous sulfate 325 (65 FE) MG EC tablet Take 1 tablet by mouth daily (with breakfast) 16   Tiff Frye MD   docusate sodium (COLACE, DULCOLAX) 100 MG CAPS Take 100 mg by mouth 2 times daily  Patient not taking: Reported on 2023   Tiff Frye MD   tamsulosin (FLOMAX) 0.4 MG capsule Take 0.4 mg by mouth 2 times daily     Historical Provider, MD   omeprazole (PRILOSEC) 20 MG capsule Take 20 mg by mouth daily    Historical Provider, MD   simvastatin (ZOCOR) 40 MG tablet Take 40 mg by mouth nightly Historical Provider, MD       Current medications:    Current Facility-Administered Medications   Medication Dose Route Frequency Provider Last Rate Last Admin    [START ON 1/28/2023] lidocaine PF 1 % injection 1 mL  1 mL IntraDERmal Once PRN Ethel Pagan MD        0.9 % sodium chloride infusion   IntraVENous Continuous Ethel Pagan MD        lactated ringers IV soln infusion   IntraVENous Continuous Ethel Pagan MD           Allergies:     Allergies   Allergen Reactions    Pcn [Penicillins] Hives, Itching and Swelling       Problem List:    Patient Active Problem List   Diagnosis Code    Rectal bleeding K62.5    Acute lower GI bleeding K92.2    Diarrhea R19.7    History of colon polyps Z86.010    Chest pain R07.9    GERD (gastroesophageal reflux disease) K21.9    Hypercholesterolemia E78.00       Past Medical History:        Diagnosis Date    Anesthesia complication     pt states he had trouble urinating and moving his bowels (bloody stools) after his last surgery 10/2015)    Arthritis     back    BPH (benign prostatic hyperplasia)     CAD (coronary artery disease)     x 1 stent    Chronic shoulder pain     lt (on 10//28/16 pt states \"I'm due to have surgery next month\"    GERD (gastroesophageal reflux disease)     Hyperlipidemia     Hypertension     Dr. Jason Perez - cardiologist    Kidney stone     passed    Lung collapse yrs ago    chest tube insertion (patient coughed and sneezed at same time)    Retention of urine        Past Surgical History:        Procedure Laterality Date    CERVICAL FUSION      COLONOSCOPY      CORONARY ANGIOPLASTY WITH STENT PLACEMENT  03/22/2016    x 1    CYSTOSCOPY  12/04/2015    ENDOSCOPY, COLON, DIAGNOSTIC      FINGER REPLANTATION Left     FINGER TRIGGER RELEASE Right     LUMBAR DISCECTOMY  02/10/2016    posterior L5-S1    ROTATOR CUFF REPAIR Bilateral     SHOULDER ARTHROSCOPY Left 01/11/2017    subacromial decompression       Social History:    Social History Tobacco Use    Smoking status: Former     Packs/day: 0.50     Years: 40.00     Pack years: 20.00     Types: Cigarettes     Quit date: 10/12/2000     Years since quittin.3    Smokeless tobacco: Never   Substance Use Topics    Alcohol use: No                                Counseling given: Not Answered      Vital Signs (Current):   Vitals:    23 1108 23 1112   BP: 114/65    Pulse: 96    Resp: 16    Temp: 97.1 °F (36.2 °C)    TempSrc: Temporal    SpO2: 98%    Weight:  170 lb (77.1 kg)   Height:  5' 4\" (1.626 m)                                              BP Readings from Last 3 Encounters:   23 114/65   21 126/74   17 110/60       NPO Status: Time of last liquid consumption:                         Time of last solid consumption:                         Date of last liquid consumption: 23                        Date of last solid food consumption: 23    BMI:   Wt Readings from Last 3 Encounters:   23 170 lb (77.1 kg)   21 173 lb 6.4 oz (78.7 kg)   17 174 lb 9.7 oz (79.2 kg)     Body mass index is 29.18 kg/m².     CBC:   Lab Results   Component Value Date/Time    WBC 8.0 2021 11:40 AM    RBC 5.51 2021 11:40 AM    HGB 13.0 2021 11:40 AM    HCT 42.7 2021 11:40 AM    MCV 77.5 2021 11:40 AM    RDW 17.2 2021 11:40 AM     2021 11:40 AM       CMP:   Lab Results   Component Value Date/Time     2021 11:40 AM    K 4.4 2021 11:40 AM     2021 11:40 AM    CO2 24 2021 11:40 AM    BUN 12 2021 11:40 AM    CREATININE 0.82 2021 11:40 AM    GFRAA >60 2021 11:40 AM    LABGLOM >60 2021 11:40 AM    GLUCOSE 130 2021 11:40 AM    PROT 7.2 2021 11:40 AM    CALCIUM 9.3 2021 11:40 AM    BILITOT 0.62 2021 11:40 AM    ALKPHOS 105 2021 11:40 AM    AST 16 2021 11:40 AM    ALT 19 2021 11:40 AM       POC Tests: No results for input(s): POCGLU, POCNA, POCK, POCCL, POCBUN, POCHEMO, POCHCT in the last 72 hours. Coags:   Lab Results   Component Value Date/Time    PROTIME 10.0 03/21/2016 06:46 AM    INR 0.9 03/21/2016 06:46 AM       HCG (If Applicable): No results found for: PREGTESTUR, PREGSERUM, HCG, HCGQUANT     ABGs: No results found for: PHART, PO2ART, TOH3KGH, CIM6XBT, BEART, S5ATDCLI     Type & Screen (If Applicable):  No results found for: LABABO, LABRH    Drug/Infectious Status (If Applicable):  No results found for: HIV, HEPCAB    COVID-19 Screening (If Applicable): No results found for: COVID19        Anesthesia Evaluation   no history of anesthetic complications:   Airway: Mallampati: II  TM distance: >3 FB   Neck ROM: full  Mouth opening: > = 3 FB   Dental: normal exam         Pulmonary:       (-) COPD and sleep apnea                           Cardiovascular:  Exercise tolerance: no interval change,   (+) hypertension:, CAD:, CABG/stent (2016):, hyperlipidemia      ECG reviewed               Beta Blocker:  Dose within 24 Hrs         Neuro/Psych:      (-) seizures and CVA            ROS comment: Cervical fusion, back surgery GI/Hepatic/Renal:   (+) GERD:, renal disease: kidney stones,           Endo/Other:    (+) blood dyscrasia (antiplatelet therapy)::., .    (-) diabetes mellitus               Abdominal:             Vascular:     - PVD. Other Findings:           Anesthesia Plan      general and MAC     ASA 3       Induction: intravenous. MIPS: prophylactic pharmacologic antiemetic agents not administered perioperatively for documented reasons. Anesthetic plan and risks discussed with patient. Plan discussed with CRNA.     Attending anesthesiologist reviewed and agrees with Colin Whiteside MD   1/27/2023

## 2023-01-28 LAB
CULTURE: NO GROWTH
SPECIMEN DESCRIPTION: NORMAL

## 2023-01-28 PROCEDURE — 2580000003 HC RX 258: Performed by: STUDENT IN AN ORGANIZED HEALTH CARE EDUCATION/TRAINING PROGRAM

## 2023-01-28 PROCEDURE — 6360000002 HC RX W HCPCS: Performed by: STUDENT IN AN ORGANIZED HEALTH CARE EDUCATION/TRAINING PROGRAM

## 2023-01-28 RX ORDER — CEPHALEXIN 500 MG/1
500 CAPSULE ORAL 2 TIMES DAILY
Qty: 14 CAPSULE | Refills: 0 | Status: SHIPPED | OUTPATIENT
Start: 2023-01-28 | End: 2023-02-04

## 2023-01-28 RX ADMIN — CEFTRIAXONE 2000 MG: 2 INJECTION, POWDER, FOR SOLUTION INTRAMUSCULAR; INTRAVENOUS at 00:09

## 2023-01-28 NOTE — DISCHARGE INSTRUCTIONS
Remain with Flores in place until seen by Dr. Stef Cunningham. Take Keflex as well as Bactrim until you complete both courses. PLEASE RETURN TO THE EMERGENCY DEPARTMENT IMMEDIATELY for worsening symptoms, inability to urinate, worsening of blood in your urine, or if you develop any concerning symptoms such as: high fever not relieved by acetaminophen (Tylenol) and/or ibuprofen (Motrin / Advil), chills, shortness of breath, chest pain, feeling of your heart fluttering or racing, persistent nausea and/or vomiting, vomiting up blood, blood in your stool, loss of consciousness, numbness, weakness or tingling in the arms or legs or change in color of the extremities, changes in mental status, persistent headache, blurry vision, loss of bladder / bowel.

## 2023-01-28 NOTE — ED NOTES
Updated patient and wife at bedside, awaiting provider review of patient results for disposition of patient. Emptied patient Flores bag which is still gera red/bloody, but urine in catheter tubing is clear and light straw colored.       Riccardo Curtis., RN  77/46/06 6442

## 2023-01-28 NOTE — ED NOTES
Started patient IV antibiotic ordered and provided boxed lunch per ok of Dr. Pamella Arreaga.       Riccardo Curtis., RN  95/15/62 1982

## 2023-01-28 NOTE — ED NOTES
Placed 16F Flores catheter, 500 mL bloody urine initial output, patient tolerated procedure well.       Bertha Ely., RN  79/87/37 [id number]

## 2023-01-29 NOTE — ED PROVIDER NOTES
[address] [facility]      Pt Name: Yves Gabriel  MRN: [id number]  [place] 1945  Date of evaluation: 1/28/23    CHIEF COMPLAINT       Chief Complaint   Patient presents with    Urinary Retention     Pt states he had a procedure done with Dr Lynnette Hernández today and now is unable to urinate. HISTORY OF PRESENT ILLNESS   Yves Gabriel is a 78 y.o. male who presents with urinary retention after prostate biopsy with Dr. Aleksandr Morgan today. Denies any antiplatelet or anticoagulant use. Having suprapubic pain worsening. Able to produce him tiny amount of urine with significant force.     PASTMEDICAL HISTORY     Past Medical History:   Diagnosis Date    Anesthesia complication     pt states he had trouble urinating and moving his bowels (bloody stools) after his last surgery 10/2015)    Arthritis     back    BPH (benign prostatic hyperplasia)     CAD (coronary artery disease)     x 1 stent    Chronic shoulder pain     lt (on 10//28/16 pt states \"I'm due to have surgery next month\"    GERD (gastroesophageal reflux disease)     Hyperlipidemia     Hypertension     Dr. Amalia Hanson - cardiologist    Kidney stone     passed    Lung collapse yrs ago    chest tube insertion (patient coughed and sneezed at same time)    Retention of urine      Past Problem List  Patient Active Problem List   Diagnosis Code    Rectal bleeding K62.5    Acute lower GI bleeding K92.2    Diarrhea R19.7    History of colon polyps Z86.010    Chest pain R07.9    GERD (gastroesophageal reflux disease) K21.9    Hypercholesterolemia E78.00       SURGICAL HISTORY       Past Surgical History:   Procedure Laterality Date    CERVICAL FUSION      COLONOSCOPY      CORONARY ANGIOPLASTY WITH STENT PLACEMENT  03/22/2016    x 1    CYSTOSCOPY  12/04/2015    CYSTOSCOPY  01/27/2023    ENDOSCOPY, COLON, DIAGNOSTIC      FINGER REPLANTATION Left     FINGER TRIGGER RELEASE Right     LUMBAR DISCECTOMY  02/10/2016    posterior L5-S1    PROSTATE BIOPSY  01/27/2023 PROSTATE BIOPSY N/A 1/27/2023    CYSTOSCOPY PROSTATE BIOPSY   WITH U/S TECH performed by Ladonna Jeans, MD at [address] Bilateral     SHOULDER ARTHROSCOPY Left 01/11/2017    subacromial decompression    US PROSTATE NEEDLE PUNCH  1/27/2023    US PROSTATE NEEDLE PUNCH 1/27/2023 [facility] ULTRASOUND       CURRENT MEDICATIONS       Discharge Medication List as of 1/28/2023  1:11 AM        CONTINUE these medications which have NOT CHANGED    Details   aspirin 81 MG chewable tablet Take 81 mg by mouth dailyHistorical Med      sulfamethoxazole-trimethoprim (BACTRIM) 400-80 MG per tablet Take 1 tablet by mouth 2 times daily for 5 days, Disp-10 tablet, R-0Print      carvedilol (COREG) 12.5 MG tablet Take 12.5 mg by mouth 2 times daily (with meals) Unsure of dose      isosorbide mononitrate (IMDUR) 30 MG CR tablet Take 30 mg by mouth daily      lisinopril (PRINIVIL;ZESTRIL) 2.5 MG tablet Take 2.5 mg by mouth daily      nitroGLYCERIN (NITROSTAT) 0.4 MG SL tablet Place 0.4 mg under the tongue every 5 minutes as needed for Chest pain      oxyCODONE-acetaminophen (PERCOCET) 5-325 MG per tablet Take 1 tablet by mouth every 4 hours as needed for Pain out      ibuprofen (ADVIL;MOTRIN) 800 MG tablet Take 800 mg by mouth every 6 hours as needed for Pain      ferrous sulfate 325 (65 FE) MG EC tablet Take 1 tablet by mouth daily (with breakfast), Disp-90 tablet, R-3      docusate sodium (COLACE, DULCOLAX) 100 MG CAPS Take 100 mg by mouth 2 times daily, Disp-30 capsule, R-1      tamsulosin (FLOMAX) 0.4 MG capsule Take 0.4 mg by mouth 2 times daily       omeprazole (PRILOSEC) 20 MG capsule Take 20 mg by mouth daily      simvastatin (ZOCOR) 40 MG tablet Take 40 mg by mouth nightly             ALLERGIES     is allergic to pcn [penicillins]. FAMILY HISTORY     has no family status information on file.         SOCIAL HISTORY       Social History     Tobacco Use    Smoking status: Former     Packs/day: 0.50     Years: 40.00     Pack years: 20.00     Types: Cigarettes     Quit date: 10/12/2000     Years since quittin.3    Smokeless tobacco: Never   Substance Use Topics    Alcohol use: No    Drug use: No       PHYSICAL EXAM     INITIAL VITALS: BP (!) 128/41   Pulse (!) 103   Temp 98.6 °F (37 °C) (Oral)   Resp 20   Ht 5' 4\" (1.626 m)   Wt 170 lb (77.1 kg)   SpO2 97%   BMI 29.18 kg/m²    Physical Exam  Vitals and nursing note reviewed. Constitutional:       General: He is not in acute distress. Appearance: He is well-developed. He is not toxic-appearing. HENT:      Head: Normocephalic and atraumatic. Nose: Nose normal.      Mouth/Throat:      Mouth: Mucous membranes are moist.   Eyes:      General: No scleral icterus. Conjunctiva/sclera: Conjunctivae normal.      Pupils: Pupils are equal, round, and reactive to light. Cardiovascular:      Rate and Rhythm: Normal rate and regular rhythm. Heart sounds: Normal heart sounds. No murmur heard. No friction rub. No gallop. Pulmonary:      Effort: Pulmonary effort is normal. No respiratory distress. Breath sounds: Normal breath sounds. No wheezing or rales. Abdominal:      Palpations: Abdomen is soft. Comments: Suprapubic fullness and pain to palpation   Musculoskeletal:         General: Normal range of motion. Skin:     General: Skin is warm and dry. Findings: No erythema or rash. Neurological:      Mental Status: He is alert and oriented to person, place, and time. Psychiatric:         Behavior: Behavior normal.       MEDICAL DECISION MAKING / ED COURSE:   Summary of Patient Presentation:      1)  Number and Complexity of Problems  Problem List This Visit:    1. Urinary retention    2. Gross hematuria    3.  Acute cystitis with hematuria        Differential Diagnosis: Retention versus hematuria versus UTI    Diagnoses Considered but Do Not Suspect: AAA    Pertinent Comorbid Conditions:    Past Medical History:   Diagnosis Date Anesthesia complication     pt states he had trouble urinating and moving his bowels (bloody stools) after his last surgery 10/2015)    Arthritis     back    BPH (benign prostatic hyperplasia)     CAD (coronary artery disease)     x 1 stent    Chronic shoulder pain     lt (on 10//28/16 pt states \"I'm due to have surgery next month\"    GERD (gastroesophageal reflux disease)     Hyperlipidemia     Hypertension     Dr. Fajardo Factor - cardiologist    Kidney stone     passed    Lung collapse yrs ago    chest tube insertion (patient coughed and sneezed at same time)    Retention of urine        2)  Data Reviewed    External Documents Reviewed: Previous ER visits reviewed by myself  3)  Treatment and Disposition  [Patient repeat assessment, Disposition discussion with patient/family, Case discussed with consulting clinician, MIPS, Social determinants of health impacting treatment or disposition, Shared Decision Making, Code Status Discussion:]    Bladder ultrasound performed by myself showing significant amount of urine in bladder. Flores catheterization performed by nursing. Approximately 5 to 600 mL of blood-tinged urine. Significant pain relief. Labs showing slight ANNA creatinine of 1.3 GFR 56. White count of 14. Urine nitrate positive patient is already taking Bactrim and Keflex. Otherwise overall nontoxic and well-appearing. Feeling much relief of pain. Discussed with Dr. Wyvonna Buerger of urology. Encourage strict return precautions especially fever, nausea, decreased urine output. Based on the low acuity of concerning symptoms and improvement of symptoms, patient will be discharged with follow up and prescription information listed in the Disposition section. Patient states they will follow-up with primary care physician and/or return to the emergency department should they experience a change or worsening of symptoms.   Patient will be discharged with resources: summary of visit, instructions, follow-up information, prescriptions if necessary. Patient/ family instructed to read discharge paperwork. All of their questions and concerns were addressed. Suspicion for any acute life-threatening processes is low. Patient voices understanding of plan. DATA FOR LAB AND RADIOLOGY TESTS ORDERED BELOW ARE REVIEWED BY THE ED CLINICIAN:    RADIOLOGY: All x-rays, CT, MRI, and formal ultrasound images (except ED bedside ultrasound) are read by the radiologist, see reports below, unless otherwise noted in MDM or here. Reports below are reviewed by myself. No orders to display       LABS: Lab orders shown below, the results are reviewed by myself, and all abnormals are listed below.   Labs Reviewed   BASIC METABOLIC PANEL - Abnormal; Notable for the following components:       Result Value    Glucose 129 (*)     Creatinine 1.33 (*)     Est, Glom Filt Rate 56 (*)     All other components within normal limits   CBC WITH AUTO DIFFERENTIAL - Abnormal; Notable for the following components:    WBC 13.6 (*)     MCV 76.2 (*)     MCH 23.3 (*)     RDW 17.5 (*)     Seg Neutrophils 83 (*)     Lymphocytes 9 (*)     Eosinophils % 0 (*)     Immature Granulocytes 1 (*)     Segs Absolute 11.27 (*)     All other components within normal limits   URINALYSIS WITH MICROSCOPIC - Abnormal; Notable for the following components:    Color, UA Red (*)     Turbidity UA Cloudy (*)     Urine Hgb 3+ (*)     Protein, UA 2+ (*)     Nitrite, Urine POSITIVE (*)     Leukocyte Esterase, Urine TRACE (*)     All other components within normal limits   CULTURE, URINE   APTT   PROTIME-INR       Vitals Reviewed:    Vitals:    01/27/23 2040   BP: (!) 128/41   Pulse: (!) 103   Resp: 20   Temp: 98.6 °F (37 °C)   TempSrc: Oral   SpO2: 97%   Weight: 170 lb (77.1 kg)   Height: 5' 4\" (1.626 m)     MEDICATIONS GIVEN TO PATIENT THIS ENCOUNTER:  Orders Placed This Encounter   Medications    lidocaine (XYLOCAINE) 2 % uro-jet 5 mL    cefTRIAXone (ROCEPHIN) 2,000 mg in sodium chloride 0.9 % 50 mL IVPB (mini-bag)     Order Specific Question:   Antimicrobial Indications     Answer:   Urinary Tract Infection    cephALEXin (KEFLEX) 500 MG capsule     Sig: Take 1 capsule by mouth 2 times daily for 7 days     Dispense:  14 capsule     Refill:  0     DISCHARGE PRESCRIPTIONS:  Discharge Medication List as of 1/28/2023  1:11 AM        START taking these medications    Details   cephALEXin (KEFLEX) 500 MG capsule Take 1 capsule by mouth 2 times daily for 7 days, Disp-14 capsule, R-0Print           PHYSICIAN CONSULTS ORDERED THIS ENCOUNTER:  IP CONSULT TO UROLOGY    ED Course Notes From Epic Narrator:  ED Course as of 01/28/23 1924   Sat Jan 28, 2023   0024 Was on Bactrim, transition to Keflex, retain Flores, Flomax, follow-up with Ronaldo Urban [MS]      ED Course User Index  [MS] Noni Upton DO         CRITICAL CARE:   0    PROCEDURES:  none    FINAL IMPRESSION      1. Urinary retention    2. Gross hematuria    3.  Acute cystitis with hematuria          DISPOSITION/PLAN   DISPOSITION Decision To Discharge 01/28/2023 01:09:26 AM      OUTPATIENT FOLLOW UP THE PATIENT:  Ben Clement MD  [address]  [address]  [phone]    Schedule an appointment as soon as possible for a visit in 1 week  As needed      DISCHARGE MEDICATIONS:  Discharge Medication List as of 1/28/2023  1:11 AM        START taking these medications    Details   cephALEXin (KEFLEX) 500 MG capsule Take 1 capsule by mouth 2 times daily for 7 days, Disp-14 capsule, R-0Print             Noni Upton DO  EmergencyMedicine Attending    (Please note that portions of this note were completed with a voice recognition program.  Efforts were made to edit the dictations but occasionally words are mis-transcribed.)     Noni Upton DO  01/28/23 1928

## 2023-02-01 LAB — SURGICAL PATHOLOGY REPORT: NORMAL

## 2023-02-05 ENCOUNTER — HOSPITAL ENCOUNTER (EMERGENCY)
Age: 78
Discharge: HOME | End: 2023-02-05
Attending: EMERGENCY MEDICINE
Payer: COMMERCIAL

## 2023-02-05 VITALS
DIASTOLIC BLOOD PRESSURE: 69 MMHG | HEART RATE: 89 BPM | HEIGHT: 64 IN | TEMPERATURE: 97.9 F | WEIGHT: 170 LBS | BODY MASS INDEX: 29.02 KG/M2 | OXYGEN SATURATION: 98 % | SYSTOLIC BLOOD PRESSURE: 112 MMHG | RESPIRATION RATE: 16 BRPM

## 2023-02-05 DIAGNOSIS — Z46.6 ENCOUNTER FOR FOLEY CATHETER REMOVAL: Primary | ICD-10-CM

## 2023-02-05 PROCEDURE — 51798 US URINE CAPACITY MEASURE: CPT

## 2023-02-05 PROCEDURE — 99282 EMERGENCY DEPT VISIT SF MDM: CPT

## 2023-02-05 ASSESSMENT — ENCOUNTER SYMPTOMS
CHEST TIGHTNESS: 0
ABDOMINAL DISTENTION: 0
EYE DISCHARGE: 0
FACIAL SWELLING: 0
BACK PAIN: 0
EYE PAIN: 0
SHORTNESS OF BREATH: 0
ABDOMINAL PAIN: 0

## 2023-02-05 ASSESSMENT — PAIN - FUNCTIONAL ASSESSMENT: PAIN_FUNCTIONAL_ASSESSMENT: NONE - DENIES PAIN

## 2023-02-05 NOTE — ED NOTES
Patient bladder scanned, had 55 mL in bladder. Flores catheter saline balloon deflated, catheter removed without issue. Patient provided urinal and instructed to void per urinal when he has to go and call out when he does so, wife is at bedside.       Jeanne Crouch, RN  37/51/08 1016

## 2023-02-05 NOTE — ED PROVIDER NOTES
EMERGENCY DEPARTMENT ENCOUNTER    Pt Name: Denisa Blanco  MRN: [id number]  [place] 1945  Date of evaluation: 2/5/23  CHIEF COMPLAINT       Chief Complaint   Patient presents with    Other     Pt states his carroll bag broke and needs it replaced or taken out. Pt states he was to get the carroll out tomorrow      HISTORY OF PRESENT ILLNESS   HPI   The patient is 79-year-old male who presented to the emergency department secondary to Carroll catheter breaking. Patient had a Carroll catheter secondary to urinary retention. Patient and patient procedure and has a history of constipation as well as urinary retention as a adverse reaction anesthesia. Patient was scheduled to see Dr. Shannan Harris tomorrow to have the Carroll removed but due to a bursting he came to the emergency department. Patient denies any trauma to the Carroll, no urethral pain. Patient denies chest pain, shortness of breath, nausea, vomiting, fevers or chills      REVIEW OF SYSTEMS     Review of Systems   Constitutional:  Negative for chills, diaphoresis and fever. HENT:  Negative for congestion, ear pain and facial swelling. Eyes:  Negative for pain, discharge and visual disturbance. Respiratory:  Negative for chest tightness and shortness of breath. Cardiovascular:  Negative for chest pain and palpitations. Gastrointestinal:  Negative for abdominal distention and abdominal pain. Genitourinary:  Negative for difficulty urinating and flank pain. Musculoskeletal:  Negative for back pain. Skin:  Negative for wound. Neurological:  Negative for dizziness, light-headedness and headaches.    PASTMEDICAL HISTORY     Past Medical History:   Diagnosis Date    Anesthesia complication     pt states he had trouble urinating and moving his bowels (bloody stools) after his last surgery 10/2015)    Arthritis     back    BPH (benign prostatic hyperplasia)     CAD (coronary artery disease)     x 1 stent    Chronic shoulder pain     lt (on 10//28/16 pt states \"I'm due to have surgery next month\"    GERD (gastroesophageal reflux disease)     Hyperlipidemia     Hypertension     Dr. Luna - cardiologist    Kidney stone     passed    Lung collapse yrs ago    chest tube insertion (patient coughed and sneezed at same time)    Retention of urine      Past Problem List  Patient Active Problem List   Diagnosis Code    Rectal bleeding K62.5    Acute lower GI bleeding K92.2    Diarrhea R19.7    History of colon polyps Z86.010    Chest pain R07.9    GERD (gastroesophageal reflux disease) K21.9    Hypercholesterolemia E78.00     SURGICAL HISTORY       Past Surgical History:   Procedure Laterality Date    CERVICAL FUSION      COLONOSCOPY      CORONARY ANGIOPLASTY WITH STENT PLACEMENT  03/22/2016    x 1    CYSTOSCOPY  12/04/2015    CYSTOSCOPY  01/27/2023    ENDOSCOPY, COLON, DIAGNOSTIC      FINGER REPLANTATION Left     FINGER TRIGGER RELEASE Right     LUMBAR DISCECTOMY  02/10/2016    posterior L5-S1    PROSTATE BIOPSY  01/27/2023    PROSTATE BIOPSY N/A 1/27/2023    CYSTOSCOPY PROSTATE BIOPSY   WITH U/S TECH performed by Ran Jules MD at [facility] OR    ROTATOR CUFF REPAIR Bilateral     SHOULDER ARTHROSCOPY Left 01/11/2017    subacromial decompression    US PROSTATE NEEDLE PUNCH  1/27/2023    US PROSTATE NEEDLE PUNCH 1/27/2023 [facility] ULTRASOUND     CURRENT MEDICATIONS       Previous Medications    ASPIRIN 81 MG CHEWABLE TABLET    Take 81 mg by mouth daily    CARVEDILOL (COREG) 12.5 MG TABLET    Take 12.5 mg by mouth 2 times daily (with meals) Unsure of dose    DOCUSATE SODIUM (COLACE, DULCOLAX) 100 MG CAPS    Take 100 mg by mouth 2 times daily    FERROUS SULFATE 325 (65 FE) MG EC TABLET    Take 1 tablet by mouth daily (with breakfast)    IBUPROFEN (ADVIL;MOTRIN) 800 MG TABLET    Take 800 mg by mouth every 6 hours as needed for Pain    ISOSORBIDE MONONITRATE (IMDUR) 30 MG CR TABLET    Take 30 mg by mouth daily    LISINOPRIL (PRINIVIL;ZESTRIL) 2.5 MG TABLET    Take 2.5 mg by mouth  daily    NITROGLYCERIN (NITROSTAT) 0.4 MG SL TABLET    Place 0.4 mg under the tongue every 5 minutes as needed for Chest pain    OMEPRAZOLE (PRILOSEC) 20 MG CAPSULE    Take 20 mg by mouth daily    OXYCODONE-ACETAMINOPHEN (PERCOCET) 5-325 MG PER TABLET    Take 1 tablet by mouth every 4 hours as needed for Pain out    SIMVASTATIN (ZOCOR) 40 MG TABLET    Take 40 mg by mouth nightly    TAMSULOSIN (FLOMAX) 0.4 MG CAPSULE    Take 0.4 mg by mouth 2 times daily      ALLERGIES     is allergic to pcn [penicillins]. FAMILY HISTORY     has no family status information on file. SOCIAL HISTORY       Social History     Tobacco Use    Smoking status: Former     Packs/day: 0.50     Years: 40.00     Pack years: 20.00     Types: Cigarettes     Quit date: 10/12/2000     Years since quittin.3    Smokeless tobacco: Never   Substance Use Topics    Alcohol use: No    Drug use: No     PHYSICAL EXAM     INITIAL VITALS: /69   Pulse 89   Temp 97.9 °F (36.6 °C) (Oral)   Resp 16   Ht 5' 4\" (1.626 m)   Wt 170 lb (77.1 kg)   SpO2 98%   BMI 29.18 kg/m²    Physical Exam  Vitals and nursing note reviewed. Constitutional:       General: He is not in acute distress. Appearance: He is well-developed. He is not diaphoretic. HENT:      Head: Normocephalic and atraumatic. Eyes:      Pupils: Pupils are equal, round, and reactive to light. Cardiovascular:      Rate and Rhythm: Normal rate and regular rhythm. Pulmonary:      Effort: Pulmonary effort is normal.      Breath sounds: Normal breath sounds. Abdominal:      General: Bowel sounds are normal.      Palpations: Abdomen is soft. Musculoskeletal:         General: Normal range of motion. Cervical back: Normal range of motion and neck supple. Skin:     General: Skin is warm. Capillary Refill: Capillary refill takes less than 2 seconds. Neurological:      Mental Status: He is alert and oriented to person, place, and time.        MEDICAL DECISION MAKING / ED COURSE:   Summary of Patient Presentation:    The patient is a 82-year-old male who presented to the emergency department secondary to Flores catheter malfunction. Flores removed, patient observed in the emergency department for 30 minutes able to urinate without difficulty. 1)  Number and Complexity of Problems  Problem List This Visit: Flores catheter dysunction    Differential Diagnosis: urinary retention    Diagnoses Considered but Do Not Suspect:  none    Pertinent Comorbid Conditions:  none    2)  Data Reviewed  My EKG interpretation:  NA     Decision Rules/Scores utilized:  NA    HEART SCORE: NA*       NIH STROKE SCALE         Tests considered but not ordered and why:  none    External Documents Reviewed:  none    Imaging that is independently reviewed and interpreted by me as:  none    See more data below for the lab and radiology tests and orders. 3)  Treatment and Disposition    Patient repeat assessment: Patient able to urinate after Flores removed, discharged home with outpatient urology follow-up and parameters to return to the emergency department. Disposition discussion with patient/family: Patient and wife in agreement with treatment plan for discharge home and outpatient follow-up with urology    Case discussed with consulting clinician: Dr. Gabriela Hahn paged, however prolonged return page therefore patient will be discharged home with continued outpatient follow-up    MIPS:  NA    Social determinants of health impacting treatment or disposition:  none    Shared Decision Making: The patient was involved in his/her plan of care through shared decision making. The testing that was ordered was discussed with the patient.  Any medications that may have been ordered were discussed with the patient    Code Status Discussion:  full code    \"ED Course\" Notes From Epic Narrator:         CRITICAL CARE:       PROCEDURES:    Procedures      DATA FOR LAB AND RADIOLOGY TESTS ORDERED BELOW ARE REVIEWED BY THE ED CLINICIAN:    RADIOLOGY: All x-rays, CT, MRI, and formal ultrasound images (except ED bedside ultrasound) are read by the radiologist, see reports below, unless otherwise noted in MDM or here. Reports below are reviewed by myself. No orders to display       LABS: Lab orders shown below, the results are reviewed by myself, and all abnormals are listed below. Labs Reviewed - No data to display    Vitals Reviewed:    Vitals:    02/05/23 1205   BP: 112/69   Pulse: 89   Resp: 16   Temp: 97.9 °F (36.6 °C)   TempSrc: Oral   SpO2: 98%   Weight: 170 lb (77.1 kg)   Height: 5' 4\" (1.626 m)     MEDICATIONS GIVEN TO PATIENT THIS ENCOUNTER:  No orders of the defined types were placed in this encounter. DISCHARGE PRESCRIPTIONS:  New Prescriptions    No medications on file     PHYSICIAN CONSULTS ORDERED THIS ENCOUNTER:  None  FINAL IMPRESSION      1.  Encounter for Flores catheter removal          DISPOSITION/PLAN   DISPOSITION Decision To Discharge 02/05/2023 01:18:17 PM      OUTPATIENT FOLLOW UP THE PATIENT:  Lilian Clark MD  [address] [facility] [address]  [phone]    Schedule an appointment as soon as possible for a visit       MD Tommy Duran MD  90/41/81 6088

## 2023-02-05 NOTE — ED NOTES
Patient voided about 50 mL dark, harry urine with a small amount of blood in it. Dr. Reuben Trammell notified.       Christian Ybarra RN  30/01/91 5229

## 2023-02-05 NOTE — ED NOTES
Patient had an indwelling Flores catheter placed a few days ago for urinary retention, was to go to Dr. Pavel Clemente office tomorrow to have removed, but the collection bag broke today. Dr. Marisol Corral at bedside, ordered removal of the Flores catheter after scan of bladder, patient to void prior to discharge.       Lakia Sauceda., RN  91/77/31 0554

## 2023-09-12 ENCOUNTER — APPOINTMENT (OUTPATIENT)
Dept: GENERAL RADIOLOGY | Age: 78
End: 2023-09-12
Payer: COMMERCIAL

## 2023-09-12 ENCOUNTER — APPOINTMENT (OUTPATIENT)
Dept: CT IMAGING | Age: 78
End: 2023-09-12
Payer: COMMERCIAL

## 2023-09-12 ENCOUNTER — HOSPITAL ENCOUNTER (EMERGENCY)
Age: 78
Discharge: HOME | End: 2023-09-12
Attending: EMERGENCY MEDICINE
Payer: COMMERCIAL

## 2023-09-12 VITALS
BODY MASS INDEX: 28.67 KG/M2 | WEIGHT: 167 LBS | HEART RATE: 79 BPM | RESPIRATION RATE: 18 BRPM | SYSTOLIC BLOOD PRESSURE: 94 MMHG | DIASTOLIC BLOOD PRESSURE: 62 MMHG | OXYGEN SATURATION: 98 % | TEMPERATURE: 98.6 F

## 2023-09-12 DIAGNOSIS — R42 DIZZINESS: Primary | ICD-10-CM

## 2023-09-12 DIAGNOSIS — R55 SYNCOPE AND COLLAPSE: ICD-10-CM

## 2023-09-12 LAB
ALBUMIN SERPL-MCNC: 4.2 G/DL (ref 3.5–5.2)
ALP SERPL-CCNC: 89 U/L (ref 40–129)
ALT SERPL-CCNC: 19 U/L (ref 5–41)
ANION GAP SERPL CALCULATED.3IONS-SCNC: 12 MMOL/L (ref 9–17)
AST SERPL-CCNC: 12 U/L
BACTERIA URNS QL MICRO: ABNORMAL
BASOPHILS # BLD: 0.03 K/UL (ref 0–0.2)
BASOPHILS NFR BLD: 0 % (ref 0–2)
BILIRUB DIRECT SERPL-MCNC: 0.2 MG/DL
BILIRUB INDIRECT SERPL-MCNC: 0.5 MG/DL (ref 0–1)
BILIRUB SERPL-MCNC: 0.7 MG/DL (ref 0.3–1.2)
BILIRUB UR QL STRIP: NEGATIVE
BUN SERPL-MCNC: 17 MG/DL (ref 8–23)
BUN/CREAT SERPL: 19 (ref 9–20)
CALCIUM SERPL-MCNC: 9.3 MG/DL (ref 8.6–10.4)
CHLORIDE SERPL-SCNC: 103 MMOL/L (ref 98–107)
CLARITY UR: CLEAR
CO2 SERPL-SCNC: 24 MMOL/L (ref 20–31)
COLOR UR: YELLOW
CREAT SERPL-MCNC: 0.9 MG/DL (ref 0.7–1.2)
EOSINOPHIL # BLD: 0.06 K/UL (ref 0–0.44)
EOSINOPHILS RELATIVE PERCENT: 1 % (ref 1–4)
EPI CELLS #/AREA URNS HPF: ABNORMAL /HPF (ref 0–5)
ERYTHROCYTE [DISTWIDTH] IN BLOOD BY AUTOMATED COUNT: 18.1 % (ref 11.8–14.4)
GFR SERPL CREATININE-BSD FRML MDRD: >60 ML/MIN/1.73M2
GLUCOSE SERPL-MCNC: 101 MG/DL (ref 70–99)
GLUCOSE UR STRIP-MCNC: NEGATIVE MG/DL
HCT VFR BLD AUTO: 40.8 % (ref 40.7–50.3)
HGB BLD-MCNC: 12.8 G/DL (ref 13–17)
HGB UR QL STRIP.AUTO: ABNORMAL
IMM GRANULOCYTES # BLD AUTO: 0.02 K/UL (ref 0–0.3)
IMM GRANULOCYTES NFR BLD: 0 %
KETONES UR STRIP-MCNC: NEGATIVE MG/DL
LEUKOCYTE ESTERASE UR QL STRIP: NEGATIVE
LYMPHOCYTES NFR BLD: 1.23 K/UL (ref 1.1–3.7)
LYMPHOCYTES RELATIVE PERCENT: 18 % (ref 24–43)
MAGNESIUM SERPL-MCNC: 2.3 MG/DL (ref 1.6–2.6)
MCH RBC QN AUTO: 23.5 PG (ref 25.2–33.5)
MCHC RBC AUTO-ENTMCNC: 31.4 G/DL (ref 28.4–34.8)
MCV RBC AUTO: 75 FL (ref 82.6–102.9)
MONOCYTES NFR BLD: 0.63 K/UL (ref 0.1–1.2)
MONOCYTES NFR BLD: 9 % (ref 3–12)
MYOGLOBIN SERPL-MCNC: 50 NG/ML (ref 28–72)
NEUTROPHILS NFR BLD: 72 % (ref 36–65)
NEUTS SEG NFR BLD: 5.02 K/UL (ref 1.5–8.1)
NITRITE UR QL STRIP: NEGATIVE
NRBC BLD-RTO: 0 PER 100 WBC
PH UR STRIP: 6 [PH] (ref 5–8)
PLATELET # BLD AUTO: 243 K/UL (ref 138–453)
PMV BLD AUTO: 9.7 FL (ref 8.1–13.5)
POTASSIUM SERPL-SCNC: 4.6 MMOL/L (ref 3.7–5.3)
PROT SERPL-MCNC: 7.1 G/DL (ref 6.4–8.3)
PROT UR STRIP-MCNC: NEGATIVE MG/DL
RBC # BLD AUTO: 5.44 M/UL (ref 4.21–5.77)
RBC # BLD: ABNORMAL 10*6/UL
RBC #/AREA URNS HPF: ABNORMAL /HPF (ref 0–2)
SODIUM SERPL-SCNC: 139 MMOL/L (ref 135–144)
SP GR UR STRIP: 1.01 (ref 1–1.03)
TROPONIN I SERPL HS-MCNC: 9 NG/L (ref 0–22)
UROBILINOGEN UR STRIP-ACNC: NORMAL EU/DL (ref 0–1)
WBC #/AREA URNS HPF: ABNORMAL /HPF (ref 0–5)
WBC OTHER # BLD: 7 K/UL (ref 3.5–11.3)

## 2023-09-12 PROCEDURE — 83874 ASSAY OF MYOGLOBIN: CPT

## 2023-09-12 PROCEDURE — 81001 URINALYSIS AUTO W/SCOPE: CPT

## 2023-09-12 PROCEDURE — 96360 HYDRATION IV INFUSION INIT: CPT

## 2023-09-12 PROCEDURE — 83735 ASSAY OF MAGNESIUM: CPT

## 2023-09-12 PROCEDURE — 85025 COMPLETE CBC W/AUTO DIFF WBC: CPT

## 2023-09-12 PROCEDURE — 96361 HYDRATE IV INFUSION ADD-ON: CPT

## 2023-09-12 PROCEDURE — 70450 CT HEAD/BRAIN W/O DYE: CPT

## 2023-09-12 PROCEDURE — 80076 HEPATIC FUNCTION PANEL: CPT

## 2023-09-12 PROCEDURE — 99285 EMERGENCY DEPT VISIT HI MDM: CPT

## 2023-09-12 PROCEDURE — 80048 BASIC METABOLIC PNL TOTAL CA: CPT

## 2023-09-12 PROCEDURE — 71045 X-RAY EXAM CHEST 1 VIEW: CPT

## 2023-09-12 PROCEDURE — 2580000003 HC RX 258: Performed by: NURSE PRACTITIONER

## 2023-09-12 PROCEDURE — 93005 ELECTROCARDIOGRAM TRACING: CPT | Performed by: NURSE PRACTITIONER

## 2023-09-12 PROCEDURE — 72125 CT NECK SPINE W/O DYE: CPT

## 2023-09-12 PROCEDURE — 84484 ASSAY OF TROPONIN QUANT: CPT

## 2023-09-12 RX ORDER — 0.9 % SODIUM CHLORIDE 0.9 %
1000 INTRAVENOUS SOLUTION INTRAVENOUS ONCE
Status: COMPLETED | OUTPATIENT
Start: 2023-09-12 | End: 2023-09-12

## 2023-09-12 RX ADMIN — SODIUM CHLORIDE 1000 ML: 9 INJECTION, SOLUTION INTRAVENOUS at 15:40

## 2023-09-12 ASSESSMENT — ENCOUNTER SYMPTOMS
COLOR CHANGE: 0
SORE THROAT: 0
EYE PAIN: 0
COUGH: 0
DIARRHEA: 0
SHORTNESS OF BREATH: 0
VOMITING: 0
PHOTOPHOBIA: 0
NAUSEA: 0
BACK PAIN: 0
RHINORRHEA: 0
ABDOMINAL PAIN: 0

## 2023-09-12 ASSESSMENT — PAIN DESCRIPTION - DESCRIPTORS: DESCRIPTORS: ACHING

## 2023-09-12 ASSESSMENT — PAIN SCALES - GENERAL: PAINLEVEL_OUTOF10: 5

## 2023-09-12 ASSESSMENT — PAIN DESCRIPTION - FREQUENCY: FREQUENCY: CONTINUOUS

## 2023-09-12 ASSESSMENT — PAIN - FUNCTIONAL ASSESSMENT: PAIN_FUNCTIONAL_ASSESSMENT: 0-10

## 2023-09-12 ASSESSMENT — PAIN DESCRIPTION - LOCATION: LOCATION: HEAD

## 2023-09-12 NOTE — ED NOTES
Orthostatic vitals:  Lying: pulse 64 BP 98/62  Sitting: pulse 83 BP 94/62  Standing: pulse 90 BP 89/66     Luisana Esteves RN  09/12/23 6560

## 2023-09-13 LAB
EKG ATRIAL RATE: 68 BPM
EKG P AXIS: 49 DEGREES
EKG P-R INTERVAL: 152 MS
EKG Q-T INTERVAL: 382 MS
EKG QRS DURATION: 92 MS
EKG QTC CALCULATION (BAZETT): 406 MS
EKG R AXIS: 30 DEGREES
EKG T AXIS: 43 DEGREES
EKG VENTRICULAR RATE: 68 BPM

## 2023-09-13 PROCEDURE — 93010 ELECTROCARDIOGRAM REPORT: CPT | Performed by: INTERNAL MEDICINE

## 2024-03-13 ENCOUNTER — HOSPITAL ENCOUNTER (EMERGENCY)
Age: 79
Discharge: HOME | End: 2024-03-13
Attending: EMERGENCY MEDICINE
Payer: MEDICARE

## 2024-03-13 ENCOUNTER — APPOINTMENT (OUTPATIENT)
Dept: GENERAL RADIOLOGY | Age: 79
End: 2024-03-13
Payer: MEDICARE

## 2024-03-13 VITALS
BODY MASS INDEX: 28.32 KG/M2 | RESPIRATION RATE: 16 BRPM | HEART RATE: 73 BPM | HEIGHT: 65 IN | OXYGEN SATURATION: 97 % | TEMPERATURE: 97.8 F | SYSTOLIC BLOOD PRESSURE: 111 MMHG | WEIGHT: 170 LBS | DIASTOLIC BLOOD PRESSURE: 69 MMHG

## 2024-03-13 DIAGNOSIS — M25.512 ACUTE PAIN OF LEFT SHOULDER: Primary | ICD-10-CM

## 2024-03-13 LAB
ANION GAP SERPL CALCULATED.3IONS-SCNC: 15 MMOL/L (ref 9–17)
BASOPHILS # BLD: 0.03 K/UL (ref 0–0.2)
BASOPHILS NFR BLD: 0 % (ref 0–2)
BUN SERPL-MCNC: 20 MG/DL (ref 8–23)
BUN/CREAT SERPL: 25 (ref 9–20)
CALCIUM SERPL-MCNC: 9.2 MG/DL (ref 8.6–10.4)
CHLORIDE SERPL-SCNC: 102 MMOL/L (ref 98–107)
CO2 SERPL-SCNC: 23 MMOL/L (ref 20–31)
CREAT SERPL-MCNC: 0.8 MG/DL (ref 0.7–1.2)
EOSINOPHIL # BLD: 0.1 K/UL (ref 0–0.44)
EOSINOPHILS RELATIVE PERCENT: 1 % (ref 1–4)
ERYTHROCYTE [DISTWIDTH] IN BLOOD BY AUTOMATED COUNT: 17 % (ref 11.8–14.4)
GFR SERPL CREATININE-BSD FRML MDRD: >60 ML/MIN/1.73M2
GLUCOSE SERPL-MCNC: 91 MG/DL (ref 70–99)
HCT VFR BLD AUTO: 37.8 % (ref 40.7–50.3)
HGB BLD-MCNC: 11.9 G/DL (ref 13–17)
IMM GRANULOCYTES # BLD AUTO: 0.05 K/UL (ref 0–0.3)
IMM GRANULOCYTES NFR BLD: 1 %
LYMPHOCYTES NFR BLD: 1.46 K/UL (ref 1.1–3.7)
LYMPHOCYTES RELATIVE PERCENT: 18 % (ref 24–43)
MAGNESIUM SERPL-MCNC: 2.2 MG/DL (ref 1.6–2.6)
MCH RBC QN AUTO: 24.2 PG (ref 25.2–33.5)
MCHC RBC AUTO-ENTMCNC: 31.5 G/DL (ref 28.4–34.8)
MCV RBC AUTO: 76.8 FL (ref 82.6–102.9)
MONOCYTES NFR BLD: 0.75 K/UL (ref 0.1–1.2)
MONOCYTES NFR BLD: 9 % (ref 3–12)
NEUTROPHILS NFR BLD: 71 % (ref 36–65)
NEUTS SEG NFR BLD: 5.9 K/UL (ref 1.5–8.1)
NRBC BLD-RTO: 0 PER 100 WBC
PLATELET # BLD AUTO: 238 K/UL (ref 138–453)
PMV BLD AUTO: 9.2 FL (ref 8.1–13.5)
POTASSIUM SERPL-SCNC: 4.6 MMOL/L (ref 3.7–5.3)
RBC # BLD AUTO: 4.92 M/UL (ref 4.21–5.77)
RBC # BLD: ABNORMAL 10*6/UL
SODIUM SERPL-SCNC: 140 MMOL/L (ref 135–144)
TROPONIN I SERPL HS-MCNC: 11 NG/L (ref 0–22)
TROPONIN I SERPL HS-MCNC: 7 NG/L (ref 0–22)
WBC OTHER # BLD: 8.3 K/UL (ref 3.5–11.3)

## 2024-03-13 PROCEDURE — 99285 EMERGENCY DEPT VISIT HI MDM: CPT

## 2024-03-13 PROCEDURE — 93005 ELECTROCARDIOGRAM TRACING: CPT | Performed by: EMERGENCY MEDICINE

## 2024-03-13 PROCEDURE — 84484 ASSAY OF TROPONIN QUANT: CPT

## 2024-03-13 PROCEDURE — 80048 BASIC METABOLIC PNL TOTAL CA: CPT

## 2024-03-13 PROCEDURE — 71045 X-RAY EXAM CHEST 1 VIEW: CPT

## 2024-03-13 PROCEDURE — 83735 ASSAY OF MAGNESIUM: CPT

## 2024-03-13 PROCEDURE — 85025 COMPLETE CBC W/AUTO DIFF WBC: CPT

## 2024-03-13 ASSESSMENT — ENCOUNTER SYMPTOMS
DIARRHEA: 0
EYE DISCHARGE: 0
COUGH: 0
SHORTNESS OF BREATH: 0
VOMITING: 0
SORE THROAT: 0
RHINORRHEA: 0
COLOR CHANGE: 0
NAUSEA: 0
EYE REDNESS: 0

## 2024-03-13 ASSESSMENT — PAIN - FUNCTIONAL ASSESSMENT: PAIN_FUNCTIONAL_ASSESSMENT: NONE - DENIES PAIN

## 2024-03-13 NOTE — ED PROVIDER NOTES
EMERGENCY DEPARTMENT ENCOUNTER    Pt Name: Jean-Paul Agee  MRN: [id number]  Birthdate 1945  Date of evaluation: 3/13/24  CHIEF COMPLAINT       Chief Complaint   Patient presents with    Chest Pain     In left chest, radiates to left shoulder and down left arm.      HISTORY OF PRESENT ILLNESS   This is a 78-year-old male who presents emergency department with complaints of chest pain.  The patient was at his family doctor's office and he was sent here for further evaluation.  Patient states he has been having episodes of chest pain and discomfort ongoing over the past few weeks.  Symptoms are intermittent, they do not appear to be changed with activity levels.  He has some increased pain in his left arm, left upper chest and his left upper back when he lays flat.  He denies any significant nausea vomiting fevers or shortness of breath.           REVIEW OF SYSTEMS     Review of Systems   Constitutional:  Negative for chills and fever.   HENT:  Negative for rhinorrhea and sore throat.    Eyes:  Negative for discharge, redness and visual disturbance.   Respiratory:  Negative for cough and shortness of breath.    Cardiovascular:  Positive for chest pain. Negative for palpitations and leg swelling.   Gastrointestinal:  Negative for diarrhea, nausea and vomiting.   Musculoskeletal:  Negative for arthralgias, myalgias and neck pain.   Skin:  Negative for color change and rash.   Neurological:  Negative for seizures, weakness and headaches.        Left arm tingling   Psychiatric/Behavioral:  Negative for hallucinations, self-injury and suicidal ideas.      PASTMEDICAL HISTORY     Past Medical History:   Diagnosis Date    Anesthesia complication     pt states he had trouble urinating and moving his bowels (bloody stools) after his last surgery 10/2015)    Arthritis     back    BPH (benign prostatic hyperplasia)     CAD (coronary artery disease)     x 1 stent    Chronic shoulder pain     lt (on 10//28/16 pt states \"I'm due

## 2024-03-14 LAB
EKG ATRIAL RATE: 71 BPM
EKG P AXIS: 58 DEGREES
EKG P-R INTERVAL: 146 MS
EKG Q-T INTERVAL: 368 MS
EKG QRS DURATION: 100 MS
EKG QTC CALCULATION (BAZETT): 399 MS
EKG R AXIS: 41 DEGREES
EKG T AXIS: 54 DEGREES
EKG VENTRICULAR RATE: 71 BPM

## 2024-03-14 PROCEDURE — 93010 ELECTROCARDIOGRAM REPORT: CPT | Performed by: INTERNAL MEDICINE

## 2024-03-14 NOTE — ED PROVIDER NOTES
EMERGENCY DEPARTMENT ENCOUNTER    Pt Name: Jean-Paul Agee  MRN: [id number]  Birthdate 1945  Date of evaluation: 3/13/24      Patient sign out from Dr. Andersen.  78-year-old male with left shoulder pain.  Awaiting results of second troponin.    Treatment and Disposition    Patient repeat assessment: Stable.  Chest pain-free.  Labs showing potassium 4.6, creatinine 0.8, troponin 11, 7, WBC 8.3, hemoglobin 11.9, chest x-ray negative for infiltrate.  Low suspicion for cardiac etiology for his symptoms.  Most likely acute on chronic left shoulder pain, rotator cuff tear.  Advised to follow-up with cardiologist and PCP within the next 1 to 2 days.    Case discussed with consulting clinician:  N/A    Disposition discussion with patient/family: Patient aware and agrees with disposition plan.    MIPS:  N/A    Social determinants of health impacting treatment or disposition:  None    Shared Decision Making completed with patient regarding risks and benefits of admission versus discharge.  Patient decides to be discharged home.    Code Status Discussion:  Not discussed    \"ED Course\" Notes From Epic Narrator:     All questions answered.  Given strict return precautions.  No further work-up indicated at this time.    CRITICAL CARE:   N/A    PROCEDURES:  Procedures      DATA FOR LAB AND RADIOLOGY TESTS ORDERED BELOW ARE REVIEWED BY THE ED CLINICIAN:    RADIOLOGY: All x-rays, CT, MRI, and formal ultrasound images (except ED bedside ultrasound) are read by the radiologist, see reports below, unless otherwise noted in MDM or here.  Reports below are reviewed by myself.  XR CHEST PORTABLE   Final Result   No radiographic evidence of an acute cardiopulmonary process.             LABS: Lab orders shown below, the results are reviewed by myself, and all abnormals are listed below.  Labs Reviewed   BASIC METABOLIC PANEL - Abnormal; Notable for the following components:       Result Value    Bun/Cre Ratio 25 (*)     All other  components within normal limits   CBC WITH AUTO DIFFERENTIAL - Abnormal; Notable for the following components:    Hemoglobin 11.9 (*)     Hematocrit 37.8 (*)     MCV 76.8 (*)     MCH 24.2 (*)     RDW 17.0 (*)     Neutrophils % 71 (*)     Lymphocytes % 18 (*)     Immature Granulocytes 1 (*)     All other components within normal limits   MAGNESIUM   TROPONIN   TROPONIN       Vitals Reviewed:    Vitals:    03/13/24 1811 03/13/24 1812 03/13/24 1844 03/13/24 2027   BP:       Pulse: 77 73 70 73   Resp:       Temp:       SpO2: 99% 98% 96% 97%   Weight:       Height:         MEDICATIONS GIVEN TO PATIENT THIS ENCOUNTER:  No orders of the defined types were placed in this encounter.    DISCHARGE PRESCRIPTIONS:  New Prescriptions    No medications on file     PHYSICIAN CONSULTS ORDERED THIS ENCOUNTER:  None  FINAL IMPRESSION      1. Acute pain of left shoulder          DISPOSITION/PLAN   DISPOSITION Decision To Discharge 03/13/2024 09:14:37 PM      OUTPATIENT FOLLOW UP THE PATIENT:  Dixon Meade MD  [address] [facility] [id number] [phone]    In 2 days        MD Alok Quach Joseph R, MD  03/13/24 8560

## 2024-03-14 NOTE — ED NOTES
Patient had a 20 gauge IV placed in his left forearm. Writer removed Peripheral IV. Catheter intact. Patient tolerated removal well.

## 2024-10-08 ENCOUNTER — HOSPITAL ENCOUNTER (EMERGENCY)
Age: 79
Discharge: HOME | End: 2024-10-08
Payer: MEDICARE

## 2024-10-08 VITALS
TEMPERATURE: 98.1 F | DIASTOLIC BLOOD PRESSURE: 59 MMHG | HEIGHT: 64 IN | OXYGEN SATURATION: 93 % | BODY MASS INDEX: 30.22 KG/M2 | WEIGHT: 177 LBS | RESPIRATION RATE: 16 BRPM | SYSTOLIC BLOOD PRESSURE: 125 MMHG | HEART RATE: 103 BPM

## 2024-10-08 DIAGNOSIS — R39.198 DIFFICULTY URINATING: Primary | ICD-10-CM

## 2024-10-08 LAB
BACTERIA URNS QL MICRO: ABNORMAL
BILIRUB UR QL STRIP: NEGATIVE
CLARITY UR: CLEAR
COLOR UR: YELLOW
EPI CELLS #/AREA URNS HPF: ABNORMAL /HPF (ref 0–5)
GLUCOSE UR STRIP-MCNC: NEGATIVE MG/DL
HGB UR QL STRIP.AUTO: ABNORMAL
KETONES UR STRIP-MCNC: NEGATIVE MG/DL
LEUKOCYTE ESTERASE UR QL STRIP: NEGATIVE
NITRITE UR QL STRIP: NEGATIVE
PH UR STRIP: 6 [PH] (ref 5–8)
PROT UR STRIP-MCNC: ABNORMAL MG/DL
RBC #/AREA URNS HPF: ABNORMAL /HPF (ref 0–2)
SP GR UR STRIP: 1.02 (ref 1–1.03)
UROBILINOGEN UR STRIP-ACNC: NORMAL EU/DL (ref 0–1)
WBC #/AREA URNS HPF: ABNORMAL /HPF (ref 0–5)

## 2024-10-08 PROCEDURE — 51702 INSERT TEMP BLADDER CATH: CPT

## 2024-10-08 PROCEDURE — 99283 EMERGENCY DEPT VISIT LOW MDM: CPT

## 2024-10-08 PROCEDURE — 81001 URINALYSIS AUTO W/SCOPE: CPT

## 2024-10-08 ASSESSMENT — PAIN SCALES - GENERAL: PAINLEVEL_OUTOF10: 10

## 2024-10-08 ASSESSMENT — ENCOUNTER SYMPTOMS
NAUSEA: 0
VOMITING: 0
SHORTNESS OF BREATH: 0
CHEST TIGHTNESS: 0
BACK PAIN: 0
COLOR CHANGE: 0
ABDOMINAL PAIN: 0
WHEEZING: 0

## 2024-10-08 ASSESSMENT — PAIN - FUNCTIONAL ASSESSMENT: PAIN_FUNCTIONAL_ASSESSMENT: 0-10

## 2024-10-08 ASSESSMENT — LIFESTYLE VARIABLES
HOW MANY STANDARD DRINKS CONTAINING ALCOHOL DO YOU HAVE ON A TYPICAL DAY: PATIENT DOES NOT DRINK
HOW OFTEN DO YOU HAVE A DRINK CONTAINING ALCOHOL: NEVER

## 2024-10-08 NOTE — ED PROVIDER NOTES
[facility] ED  Emergency Department Encounter  Emergency Medicine Attending     Pt Name:Jean-Paul Agee  MRN: [id number]  Birthdate 1945  Date of evaluation: 10/8/24  PCP:  Dixon Meade MD  Note Started: 2:53 AM EDT      CHIEF COMPLAINT       Chief Complaint   Patient presents with    Urinary Retention     Procedure done today, unable to urinate since       HISTORY OF PRESENT ILLNESS  (Location/Symptom, Timing/Onset, Context/Setting, Quality, Duration, Modifying Factors, Severity.)      79-year-old presenting for difficulty with urination.  He had prostate biopsy yesterday performed by Dr. Jules from urology.  This occurred yesterday in the afternoon around 2 PM.  He has been unable to urinate since then.  He said that in the past when he has anesthesia and after procedures this typically occurs with him.  No fevers or chills.  No back pain.  No nausea or vomiting.            PAST MEDICAL / SURGICAL / SOCIAL / FAMILY HISTORY      has a past medical history of Anesthesia complication, Arthritis, BPH (benign prostatic hyperplasia), CAD (coronary artery disease), Chronic shoulder pain, Diabetes mellitus (HCC), GERD (gastroesophageal reflux disease), Hyperlipidemia, Hypertension, Kidney stone, Lung collapse, and Retention of urine.     has a past surgical history that includes Finger trigger release (Right); cervical fusion; Finger replantation (Left); Cystocopy (12/04/2015); Rotator cuff repair (Bilateral); lumbar discectomy (02/10/2016); Coronary angioplasty with stent (03/22/2016); Shoulder arthroscopy (Left, 01/11/2017); Colonoscopy; Endoscopy, colon, diagnostic; Cystoscopy (01/27/2023); Prostate biopsy (01/27/2023); Prostate biopsy (N/A, 1/27/2023); and US BIOPSY PROSTATE NEEDLE/PUNCH (1/27/2023).    Social History     Socioeconomic History    Marital status:      Spouse name: Not on file    Number of children: Not on file    Years of education: Not on file    Highest education level: Not on file

## 2024-10-08 NOTE — ED NOTES
Pt arrived to Ed with c/o urinary retention. Pt states he had a prostate biopsy yesterday at 230pm and hasn't been able to urinate since. Pt is having lower ABD pain/discomfort and rates pain 12/10. Pt is A&O x4, vitals are stable and breathing is even and non-labored. Pt denies needs at this time, family is at bedside and call light within reach.

## 2024-10-09 ENCOUNTER — HOSPITAL ENCOUNTER (EMERGENCY)
Age: 79
Discharge: HOME | End: 2024-10-10
Attending: EMERGENCY MEDICINE
Payer: MEDICARE

## 2024-10-09 VITALS
OXYGEN SATURATION: 96 % | TEMPERATURE: 98.3 F | DIASTOLIC BLOOD PRESSURE: 66 MMHG | HEART RATE: 87 BPM | WEIGHT: 177 LBS | BODY MASS INDEX: 30.38 KG/M2 | SYSTOLIC BLOOD PRESSURE: 122 MMHG | RESPIRATION RATE: 15 BRPM

## 2024-10-09 DIAGNOSIS — R33.9 URINARY RETENTION: Primary | ICD-10-CM

## 2024-10-09 PROCEDURE — 51798 US URINE CAPACITY MEASURE: CPT

## 2024-10-09 PROCEDURE — 51702 INSERT TEMP BLADDER CATH: CPT

## 2024-10-09 PROCEDURE — 99283 EMERGENCY DEPT VISIT LOW MDM: CPT

## 2024-10-10 LAB
BILIRUB UR QL STRIP: NEGATIVE
CLARITY UR: ABNORMAL
COLOR UR: ABNORMAL
EPI CELLS #/AREA URNS HPF: ABNORMAL /HPF (ref 0–5)
GLUCOSE UR STRIP-MCNC: NEGATIVE MG/DL
HGB UR QL STRIP.AUTO: ABNORMAL
KETONES UR STRIP-MCNC: NEGATIVE MG/DL
LEUKOCYTE ESTERASE UR QL STRIP: NEGATIVE
NITRITE UR QL STRIP: NEGATIVE
PH UR STRIP: 6 [PH] (ref 5–8)
PROT UR STRIP-MCNC: ABNORMAL MG/DL
RBC #/AREA URNS HPF: ABNORMAL /HPF (ref 0–2)
SP GR UR STRIP: 1.02 (ref 1–1.03)
UROBILINOGEN UR STRIP-ACNC: NORMAL EU/DL (ref 0–1)
WBC #/AREA URNS HPF: ABNORMAL /HPF (ref 0–5)

## 2024-10-10 PROCEDURE — 81001 URINALYSIS AUTO W/SCOPE: CPT

## 2024-10-10 NOTE — ED PROVIDER NOTES
exposure: Past    Smokeless tobacco: Never   Vaping Use    Vaping status: Never Used   Substance Use Topics    Alcohol use: No    Drug use: No     PHYSICAL EXAM     INITIAL VITALS: /66   Pulse 87   Temp 98.3 °F (36.8 °C) (Oral)   Resp 15   Wt 80.3 kg (177 lb)   SpO2 96%   BMI 30.38 kg/m²    Physical Exam  Constitutional:       Appearance: Normal appearance.   HENT:      Head: Normocephalic and atraumatic.   Eyes:      Extraocular Movements: Extraocular movements intact.      Pupils: Pupils are equal, round, and reactive to light.   Cardiovascular:      Rate and Rhythm: Normal rate and regular rhythm.   Pulmonary:      Effort: Pulmonary effort is normal.      Breath sounds: Normal breath sounds.   Abdominal:      General: Abdomen is flat.      Palpations: Abdomen is soft.      Tenderness: There is no abdominal tenderness.   Neurological:      Mental Status: He is alert.         MEDICAL DECISION MAKING / ED COURSE:   Summary of Patient Presentation:      79-year-old male, urinary retention, Flores catheter was placed without issue, patient will be discharged with outpatient follow-up and return if symptoms worsen or change.      CRITICAL CARE:       PROCEDURES:  Procedures         DATA FOR LAB AND RADIOLOGY TESTS ORDERED BELOW ARE REVIEWED BY THE ED CLINICIAN:    RADIOLOGY: All x-rays, CT, MRI, and formal ultrasound images (except ED bedside ultrasound) are read by the radiologist, see reports below, unless otherwise noted in MDM or here.  Reports below are reviewed by myself.  No orders to display       LABS: Lab orders shown below, the results are reviewed by myself, and all abnormals are listed below.  Labs Reviewed   URINALYSIS WITH MICROSCOPIC - Abnormal; Notable for the following components:       Result Value    Color, UA Red (*)     Turbidity UA Cloudy (*)     Urine Hgb 3+ (*)     Protein, UA TRACE (*)     All other components within normal limits       Vitals Reviewed:    Vitals:    10/09/24 2346  10/09/24 2348   BP:  122/66   Pulse: 87    Resp: 15    Temp: 98.3 °F (36.8 °C)    TempSrc: Oral    SpO2: 96%    Weight: 80.3 kg (177 lb)      MEDICATIONS GIVEN TO PATIENT THIS ENCOUNTER:  No orders of the defined types were placed in this encounter.    DISCHARGE PRESCRIPTIONS:  New Prescriptions    No medications on file     PHYSICIAN CONSULTS ORDERED THIS ENCOUNTER:  None  FINAL IMPRESSION      1. Urinary retention          DISPOSITION/PLAN   DISPOSITION Decision To Discharge 10/10/2024 12:50:19 AM  Condition at Disposition: Data Unavailable      OUTPATIENT FOLLOW UP THE PATIENT:  Ran Jules MD  [address] [facility] [id number] [phone]    Schedule an appointment as soon as possible for a visit in 1 day        Manuel Andersen MD        This note was created with the assistance of a speech-recognition program. While intending to generate a document that actually reflects the content of the visit, no guarantees can be provided that every mistake has been identified and corrected by editing.        Manuel Andersen MD  10/10/24 0053

## (undated) DEVICE — Z INACTIVE USE 2635504 SOLUTION IRRIG 3000ML 1.5% GL USP UROMATIC CONT

## (undated) DEVICE — BAG,LEG,COMFORT-STRAPS,LARGE,32OZ: Brand: MEDLINE

## (undated) DEVICE — Y-TYPE TUR/BLADDER IRRIGATION SET, REGULATING CLAMP

## (undated) DEVICE — DRAPE URO FNGR SURG CYSTO STD N FEN REINF W/O FLD PCH

## (undated) DEVICE — GLOVE SURG SZ 7 CRM LTX FREE POLYISOPRENE POLYMER BEAD ANTI

## (undated) DEVICE — Device

## (undated) DEVICE — TUR/ENDOSCOPIC CABLE, 10' (3.05 M): Brand: CONMED